# Patient Record
Sex: FEMALE | Race: WHITE | Employment: OTHER | URBAN - METROPOLITAN AREA
[De-identification: names, ages, dates, MRNs, and addresses within clinical notes are randomized per-mention and may not be internally consistent; named-entity substitution may affect disease eponyms.]

---

## 2017-01-12 ENCOUNTER — TELEPHONE (OUTPATIENT)
Dept: FAMILY MEDICINE CLINIC | Facility: CLINIC | Age: 52
End: 2017-01-12

## 2017-01-12 NOTE — TELEPHONE ENCOUNTER
Fax received from [employer] stating prior [place] Andre is required for Baclofen 10 mg tablets. PA initiated through Aidan Dunn. Outcome pending.

## 2017-01-16 RX ORDER — PHENYTOIN SODIUM 100 MG/1
CAPSULE, EXTENDED RELEASE ORAL
Qty: 90 CAPSULE | Refills: 0 | Status: SHIPPED | OUTPATIENT
Start: 2017-01-16 | End: 2017-02-14

## 2017-01-16 NOTE — TELEPHONE ENCOUNTER
Medication: Phenytoin 100 mg  Date of last refill: 12/14/16  [place]    Last office visit: 12/9/2015  Due back to clinic per last office note:  RTC in 8 months  Date next office visit schedul

## 2017-01-18 ENCOUNTER — TELEPHONE (OUTPATIENT)
Dept: FAMILY MEDICINE CLINIC | Facility: CLINIC | Age: 52
End: 2017-01-18

## 2017-01-18 NOTE — TELEPHONE ENCOUNTER
Pt received a letter from the [facility] Darwin Dunn stating there is insufficient medical justification for coverage of baclofen. An appeal can be done within 60 days time, but no form, phone number or address was given to pt in the letter.  She was advised to

## 2017-01-18 NOTE — TELEPHONE ENCOUNTER
Pt notified by phone that PA was initiated but that outcome was not received. She states the letter was dated about that time and may have already in the mail when the request was submitted. PA form re-faxed to [address] for [place].     Pt states she wi

## 2017-01-23 DIAGNOSIS — G72.49 INFLAMMATORY MYOPATHY: Primary | ICD-10-CM

## 2017-01-23 RX ORDER — HYDROCODONE BITARTRATE AND ACETAMINOPHEN 10; 325 MG/1; MG/1
1 TABLET ORAL EVERY 4 HOURS PRN
Qty: 120 TABLET | Refills: 0 | Status: SHIPPED | OUTPATIENT
Start: 2017-01-23 | End: 2017-02-21

## 2017-01-23 NOTE — TELEPHONE ENCOUNTER
Last OV 5/9/16, Future Appointments  Date Time Provider Marsha Pang   1/30/2017 8:00 AM MD HOMER Mix       Last rx given 12/22/16

## 2017-01-24 NOTE — TELEPHONE ENCOUNTER
Patients spouse Ettie Trimble picked up script on 01/24/17. [facility]# [phone].

## 2017-02-07 ENCOUNTER — OFFICE VISIT (OUTPATIENT)
Dept: FAMILY MEDICINE CLINIC | Facility: CLINIC | Age: 52
End: 2017-02-07

## 2017-02-07 VITALS
DIASTOLIC BLOOD PRESSURE: 82 MMHG | TEMPERATURE: 98 F | SYSTOLIC BLOOD PRESSURE: 128 MMHG | OXYGEN SATURATION: 99 % | HEART RATE: 50 BPM | RESPIRATION RATE: 16 BRPM | HEIGHT: 64 IN

## 2017-02-07 DIAGNOSIS — R05.9 COUGH: ICD-10-CM

## 2017-02-07 DIAGNOSIS — G71.02 MUSCULAR DYSTROPHY, FACIOSCAPULOHUMERAL (HCC): ICD-10-CM

## 2017-02-07 DIAGNOSIS — G70.9 NEUROMUSCULAR RESPIRATORY WEAKNESS (HCC): ICD-10-CM

## 2017-02-07 DIAGNOSIS — J99 NEUROMUSCULAR RESPIRATORY WEAKNESS (HCC): ICD-10-CM

## 2017-02-07 DIAGNOSIS — J06.9 VIRAL URI: Primary | ICD-10-CM

## 2017-02-07 PROCEDURE — 99214 OFFICE O/P EST MOD 30 MIN: CPT | Performed by: FAMILY MEDICINE

## 2017-02-07 RX ORDER — DOXYCYCLINE HYCLATE 100 MG
100 TABLET ORAL 2 TIMES DAILY
Qty: 20 TABLET | Refills: 0 | Status: SHIPPED | OUTPATIENT
Start: 2017-02-07 | End: 2017-02-08

## 2017-02-07 NOTE — PROGRESS NOTES
HPI:   Stepan Ballesteros is a 54year old female who presents for upper respiratory symptoms for  3  days. Patient reports sore throat, congestion, clear colored nasal discharge, cough with clear colored sputum.       Current Outpatient Prescriptions:  Doxyc Mother    • Renal Disease Father      ESRD   • Heart Disease Father      CHF   • Diabetes Father         Smoking Status: Former Smoker                   Packs/Day: 0.50  Years: 13        Quit date: 08/31/2013    Smokeless Status: Never Used

## 2017-02-08 ENCOUNTER — TELEPHONE (OUTPATIENT)
Dept: FAMILY MEDICINE CLINIC | Facility: CLINIC | Age: 52
End: 2017-02-08

## 2017-02-08 NOTE — TELEPHONE ENCOUNTER
[facility] stating [facility] is required for doxycycline hyclate 100 mg tablets. PA initiated today. Outcome pending.

## 2017-02-14 ENCOUNTER — OFFICE VISIT (OUTPATIENT)
Dept: NEUROLOGY | Facility: CLINIC | Age: 52
End: 2017-02-14

## 2017-02-14 VITALS — RESPIRATION RATE: 16 BRPM | HEART RATE: 60 BPM | DIASTOLIC BLOOD PRESSURE: 74 MMHG | SYSTOLIC BLOOD PRESSURE: 122 MMHG

## 2017-02-14 DIAGNOSIS — G71.02 MUSCULAR DYSTROPHY, FACIOSCAPULOHUMERAL (HCC): Primary | ICD-10-CM

## 2017-02-14 PROCEDURE — 99213 OFFICE O/P EST LOW 20 MIN: CPT | Performed by: PHYSICIAN ASSISTANT

## 2017-02-14 RX ORDER — PHENYTOIN SODIUM 100 MG/1
CAPSULE, EXTENDED RELEASE ORAL
Qty: 90 CAPSULE | Refills: 5 | Status: SHIPPED | OUTPATIENT
Start: 2017-02-14 | End: 2017-08-06

## 2017-02-14 RX ORDER — DOXYCYCLINE HYCLATE 100 MG/1
100 CAPSULE ORAL 2 TIMES DAILY
COMMUNITY
End: 2017-04-11

## 2017-02-14 NOTE — PROGRESS NOTES
HPI:    Patient ID: Stepan Ballesteros is a 54year old female. 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 She has not been seen at [facility] clinic.  Per patient she was (VITAMIN D) 400 UNITS Oral Cap Take  by mouth. Disp:  Rfl:    ibuprofen (MOTRIN) 600 MG Oral Tab Take 600 mg by mouth every 6 (six) hours as needed.  Indications: PAIN Disp:  Rfl:    PHENYTOIN SODIUM EXTENDED 100 MG Oral Cap TAKE ONE CAPSULE BY MOUTH THREE diagnosis)    Stable. Recommend that she go to the [facility] clinic at [facility] for evaluation. No orders of the defined types were placed in this encounter.        Meds This Visit:    No prescriptions requested or ordered in this encounter       Imaging &

## 2017-02-14 NOTE — TELEPHONE ENCOUNTER
Medication: Phenytoin 100 mg  Date of last refill: 12/14/16  [place] medication    Last office visit: 12/9/2015  Due back to clinic per last office note:  RTC in 8 months  Date next office visit scheduled:  0

## 2017-02-14 NOTE — PATIENT INSTRUCTIONS
Refill policies:    • Allow 2 business days for refills; controlled substances may take longer.   • Contact your pharmacy at least 5 days prior to running out of medication and have them send an electronic request or submit request through the “request re your physician has recommended that you have a procedure or additional testing performed. [facility]) will contact your insurance carrier to obtain pre-certification or prior authorization.     Unfortunately, [facility] has seen an increas

## 2017-02-21 DIAGNOSIS — G72.49 INFLAMMATORY MYOPATHY: Primary | ICD-10-CM

## 2017-02-21 RX ORDER — HYDROCODONE BITARTRATE AND ACETAMINOPHEN 10; 325 MG/1; MG/1
1 TABLET ORAL EVERY 4 HOURS PRN
Qty: 120 TABLET | Refills: 0 | Status: SHIPPED | OUTPATIENT
Start: 2017-02-21 | End: 2017-03-21

## 2017-02-22 NOTE — TELEPHONE ENCOUNTER
Patient picked up script for Nanci Pugh  on 02/22/17. [facility]# [phone].

## 2017-03-13 RX ORDER — FAMOTIDINE 20 MG/1
TABLET ORAL
Qty: 60 TABLET | Refills: 6 | Status: SHIPPED | OUTPATIENT
Start: 2017-03-13 | End: 2017-10-22

## 2017-03-21 DIAGNOSIS — G72.49 INFLAMMATORY MYOPATHY: Primary | ICD-10-CM

## 2017-03-21 RX ORDER — HYDROCODONE BITARTRATE AND ACETAMINOPHEN 10; 325 MG/1; MG/1
1 TABLET ORAL EVERY 4 HOURS PRN
Qty: 120 TABLET | Refills: 0 | Status: SHIPPED | OUTPATIENT
Start: 2017-03-21 | End: 2017-04-17

## 2017-03-21 NOTE — TELEPHONE ENCOUNTER
Patient advised script is ready for . Her  Christofer Reno will be picking up. Advised to bring photo ID.

## 2017-03-21 NOTE — TELEPHONE ENCOUNTER
Last refilled on 2/21/17 for # 120 with 0 rf. Last seen on 2/7/17. No future appointments. Thank you.

## 2017-03-29 RX ORDER — CITALOPRAM 20 MG/1
TABLET ORAL
Qty: 90 TABLET | Refills: 3 | Status: SHIPPED | OUTPATIENT
Start: 2017-03-29 | End: 2018-04-11

## 2017-03-29 NOTE — TELEPHONE ENCOUNTER
Last refill: 5-9-2016 #90 with 3 refills    Last Visit: 2-7-2017    Next Visit: No future appointments. Forward to Dr. Paco Dunn please advise on refills. Thanks.

## 2017-04-11 ENCOUNTER — OFFICE VISIT (OUTPATIENT)
Dept: FAMILY MEDICINE CLINIC | Facility: CLINIC | Age: 52
End: 2017-04-11

## 2017-04-11 VITALS
HEART RATE: 60 BPM | RESPIRATION RATE: 16 BRPM | SYSTOLIC BLOOD PRESSURE: 122 MMHG | DIASTOLIC BLOOD PRESSURE: 90 MMHG | TEMPERATURE: 97 F

## 2017-04-11 DIAGNOSIS — Z51.81 ENCOUNTER FOR MONITORING DILANTIN THERAPY: ICD-10-CM

## 2017-04-11 DIAGNOSIS — Z79.899 ENCOUNTER FOR MONITORING DILANTIN THERAPY: ICD-10-CM

## 2017-04-11 DIAGNOSIS — J99 NEUROMUSCULAR RESPIRATORY WEAKNESS (HCC): ICD-10-CM

## 2017-04-11 DIAGNOSIS — E03.9 ACQUIRED HYPOTHYROIDISM: Primary | ICD-10-CM

## 2017-04-11 DIAGNOSIS — R19.7 DIARRHEA OF PRESUMED INFECTIOUS ORIGIN: ICD-10-CM

## 2017-04-11 DIAGNOSIS — G70.9 NEUROMUSCULAR RESPIRATORY WEAKNESS (HCC): ICD-10-CM

## 2017-04-11 DIAGNOSIS — Z51.81 MEDICATION MONITORING ENCOUNTER: ICD-10-CM

## 2017-04-11 PROCEDURE — 80053 COMPREHEN METABOLIC PANEL: CPT | Performed by: FAMILY MEDICINE

## 2017-04-11 PROCEDURE — 85027 COMPLETE CBC AUTOMATED: CPT | Performed by: FAMILY MEDICINE

## 2017-04-11 PROCEDURE — 36415 COLL VENOUS BLD VENIPUNCTURE: CPT | Performed by: FAMILY MEDICINE

## 2017-04-11 PROCEDURE — 84443 ASSAY THYROID STIM HORMONE: CPT | Performed by: FAMILY MEDICINE

## 2017-04-11 PROCEDURE — 99214 OFFICE O/P EST MOD 30 MIN: CPT | Performed by: FAMILY MEDICINE

## 2017-04-11 RX ORDER — METRONIDAZOLE 500 MG/1
500 TABLET ORAL 2 TIMES DAILY
Qty: 20 TABLET | Refills: 0 | Status: SHIPPED | OUTPATIENT
Start: 2017-04-11 | End: 2017-04-21

## 2017-04-11 NOTE — PROGRESS NOTES
Jassi Flroes is a 54year old female. HPI:   Alicia has had diarrhea for about 2 weeks now, she describes it as yellow and watery, she has not had a fever , no other sick contacts.  She was taking colace but stopped it due to diarrhea, has snot been on otherwise  SKIN: denies any unusual skin lesions or rashes  RESPIRATORY: denies shortness of breath with exertion  CARDIOVASCULAR: denies chest pain on exertion  GI: generalized abdominal pain, and diarrhea, denies heartburn  NEURO: denies headaches    EXA

## 2017-04-12 ENCOUNTER — TELEPHONE (OUTPATIENT)
Dept: FAMILY MEDICINE CLINIC | Facility: CLINIC | Age: 52
End: 2017-04-12

## 2017-04-12 NOTE — TELEPHONE ENCOUNTER
----- Message from Sonia Becker DO sent at 4/12/2017  8:30 AM CDT -----  Can notify Feroz Mins her labs look pretty good overall, she could stand to drink a bit more water, but otherwise hr thyroid looks fine, and some does her blood count

## 2017-04-17 ENCOUNTER — TELEPHONE (OUTPATIENT)
Dept: FAMILY MEDICINE CLINIC | Facility: CLINIC | Age: 52
End: 2017-04-17

## 2017-04-17 DIAGNOSIS — G72.49 INFLAMMATORY MYOPATHY: Primary | ICD-10-CM

## 2017-04-17 RX ORDER — HYDROCODONE BITARTRATE AND ACETAMINOPHEN 10; 325 MG/1; MG/1
1 TABLET ORAL EVERY 4 HOURS PRN
Qty: 120 TABLET | Refills: 0 | Status: SHIPPED | OUTPATIENT
Start: 2017-04-17 | End: 2017-05-18

## 2017-04-17 NOTE — TELEPHONE ENCOUNTER
Well maybe taking a probiotic once a day might be helpful for the diarrhea, and if she is not taking an MVI she should, it would help with the cramping

## 2017-04-17 NOTE — TELEPHONE ENCOUNTER
She had to take a couple of extra doses during the night, when she gets up during the night and have bouts of diarrhea, she has cramps in her legs and cannot get back to sleep.

## 2017-04-17 NOTE — TELEPHONE ENCOUNTER
She started that after last visit. She developed a reaction to the antibiotic, was seen in the ER @ [address] and given fluids and K. Advised to d/c the antibiotic and f/u with PCP if sx did not resolve. That occurred Friday night.  She will continue the p

## 2017-04-18 ENCOUNTER — MED REC SCAN ONLY (OUTPATIENT)
Dept: FAMILY MEDICINE CLINIC | Facility: CLINIC | Age: 52
End: 2017-04-18

## 2017-04-24 ENCOUNTER — TELEPHONE (OUTPATIENT)
Dept: FAMILY MEDICINE CLINIC | Facility: CLINIC | Age: 52
End: 2017-04-24

## 2017-04-30 RX ORDER — LEVOTHYROXINE SODIUM 0.15 MG/1
TABLET ORAL
Qty: 90 TABLET | Refills: 1 | Status: SHIPPED | OUTPATIENT
Start: 2017-04-30 | End: 2017-10-22

## 2017-05-01 ENCOUNTER — OFFICE VISIT (OUTPATIENT)
Dept: FAMILY MEDICINE CLINIC | Facility: CLINIC | Age: 52
End: 2017-05-01

## 2017-05-01 VITALS
HEIGHT: 64 IN | WEIGHT: 195 LBS | BODY MASS INDEX: 33.29 KG/M2 | TEMPERATURE: 99 F | HEART RATE: 60 BPM | SYSTOLIC BLOOD PRESSURE: 130 MMHG | OXYGEN SATURATION: 98 % | DIASTOLIC BLOOD PRESSURE: 74 MMHG | RESPIRATION RATE: 14 BRPM

## 2017-05-01 DIAGNOSIS — A09 DIARRHEA OF INFECTIOUS ORIGIN: Primary | ICD-10-CM

## 2017-05-01 DIAGNOSIS — R10.84 GENERALIZED ABDOMINAL PAIN: ICD-10-CM

## 2017-05-01 PROCEDURE — 99214 OFFICE O/P EST MOD 30 MIN: CPT | Performed by: FAMILY MEDICINE

## 2017-05-01 RX ORDER — POTASSIUM CHLORIDE 20 MEQ/1
TABLET, EXTENDED RELEASE ORAL
Refills: 0 | COMMUNITY
Start: 2017-04-22 | End: 2018-02-13 | Stop reason: ALTCHOICE

## 2017-05-01 NOTE — PROGRESS NOTES
Marta Adams is a 54year old female.   HPI:   Omar Grimm is here for follow up on her diarrhea, she did the [employer] and then became constipated and then started eating more fiber and developed severe abdominal pain and cramping and then passed out due to Diagnosis Date   • Hypothyroid    • MD (muscular dystrophy) ([facility] [address]    • Shoulder fracture      right side;  September 2014; post-fall      Social History:    Smoking Status: Former Smoker                   Packs/Day: 0.50  Years: 13        Quit date: 08/31

## 2017-05-16 ENCOUNTER — MED REC SCAN ONLY (OUTPATIENT)
Dept: FAMILY MEDICINE CLINIC | Facility: CLINIC | Age: 52
End: 2017-05-16

## 2017-05-18 ENCOUNTER — TELEPHONE (OUTPATIENT)
Dept: FAMILY MEDICINE CLINIC | Facility: CLINIC | Age: 52
End: 2017-05-18

## 2017-05-18 DIAGNOSIS — G72.49 INFLAMMATORY MYOPATHY: Primary | ICD-10-CM

## 2017-05-18 RX ORDER — HYDROCODONE BITARTRATE AND ACETAMINOPHEN 10; 325 MG/1; MG/1
1 TABLET ORAL EVERY 4 HOURS PRN
Qty: 120 TABLET | Refills: 0 | Status: SHIPPED | OUTPATIENT
Start: 2017-05-18 | End: 2017-06-16

## 2017-06-16 ENCOUNTER — TELEPHONE (OUTPATIENT)
Dept: FAMILY MEDICINE CLINIC | Facility: CLINIC | Age: 52
End: 2017-06-16

## 2017-06-16 DIAGNOSIS — G72.49 INFLAMMATORY MYOPATHY: Primary | ICD-10-CM

## 2017-06-16 NOTE — TELEPHONE ENCOUNTER
Patient notified Dr Ashutosh Pedro is out of the office until Monday. States she has pills to last until Monday morning, will need to  script first thing that day.     Last OV 5/1/17  Last refilled 5/18/17  #120  0 refills

## 2017-06-19 RX ORDER — HYDROCODONE BITARTRATE AND ACETAMINOPHEN 10; 325 MG/1; MG/1
1 TABLET ORAL EVERY 4 HOURS PRN
Qty: 120 TABLET | Refills: 0 | Status: SHIPPED | OUTPATIENT
Start: 2017-06-19 | End: 2017-07-17

## 2017-06-19 NOTE — TELEPHONE ENCOUNTER
Signed rx placed at reception desk for pickup. Pt notified by phone. Her sister, Michael Hutton, or  Tereza Cruz will  rx.

## 2017-07-07 RX ORDER — BACLOFEN 10 MG/1
TABLET ORAL
Qty: 135 TABLET | Refills: 0 | Status: SHIPPED | OUTPATIENT
Start: 2017-07-07 | End: 2017-10-12

## 2017-07-15 ENCOUNTER — TELEPHONE (OUTPATIENT)
Dept: FAMILY MEDICINE CLINIC | Facility: CLINIC | Age: 52
End: 2017-07-15

## 2017-07-15 NOTE — TELEPHONE ENCOUNTER
Fax received stating citalopram requires prior authorization. Drug prior auth form completed for [facility] and faxed to [phone]. Outcome pending.

## 2017-07-17 DIAGNOSIS — G72.49 INFLAMMATORY MYOPATHY: ICD-10-CM

## 2017-07-17 RX ORDER — HYDROCODONE BITARTRATE AND ACETAMINOPHEN 10; 325 MG/1; MG/1
1 TABLET ORAL EVERY 4 HOURS PRN
Qty: 120 TABLET | Refills: 0 | Status: SHIPPED | OUTPATIENT
Start: 2017-07-17 | End: 2017-08-16

## 2017-07-17 NOTE — TELEPHONE ENCOUNTER
HYDROcodone-acetaminophen  MG Oral Tab 120 tablet 0 6/19/2017 7/19/2017    Sig - Route: Take 1 tablet by mouth every 4 (four) hours as needed for Pain. - Oral      Patients spouse, Luis Jeffrey will  script.  Advised patient to have spouse b

## 2017-08-07 RX ORDER — PHENYTOIN SODIUM 100 MG/1
CAPSULE, EXTENDED RELEASE ORAL
Qty: 90 CAPSULE | Refills: 5 | Status: SHIPPED | OUTPATIENT
Start: 2017-08-07 | End: 2018-01-29

## 2017-08-09 ENCOUNTER — TELEPHONE (OUTPATIENT)
Dept: FAMILY MEDICINE CLINIC | Facility: CLINIC | Age: 52
End: 2017-08-09

## 2017-08-09 NOTE — TELEPHONE ENCOUNTER
Fax received from [employer] stating coverage was denied for baclofen because \"muscle relaxants should generally be limited to relatively short-term terhapy (one to three weeks).  The patient has exceeded three mo

## 2017-08-11 ENCOUNTER — TELEPHONE (OUTPATIENT)
Dept: FAMILY MEDICINE CLINIC | Facility: CLINIC | Age: 52
End: 2017-08-11

## 2017-08-11 NOTE — TELEPHONE ENCOUNTER
Per patient she has spoken with medicaid and was told the pharmacy is sending the wrong form. Our office needs to fill out a prior authorization form. Advised patient Dr Jordan Bill out of office today and form will be sent Monday.   Patient asking if covering p

## 2017-08-11 NOTE — TELEPHONE ENCOUNTER
Patient states she is having trouble getting baclofen from the pharmacy. Called and spoke with Murrel Apgar at [place] who states patient needs a 4 script override. They have submitted it and have not heard back.

## 2017-08-11 NOTE — TELEPHONE ENCOUNTER
Pt is having an issue getting her script and would like to speak with a nurse about it. Please call back.

## 2017-08-12 NOTE — TELEPHONE ENCOUNTER
I sent in a PA and it got rejected because she didn't qualify. She has MD and needs it for spasm, WC bound.  The form  Is on my  counter

## 2017-08-15 ENCOUNTER — MED REC SCAN ONLY (OUTPATIENT)
Dept: FAMILY MEDICINE CLINIC | Facility: CLINIC | Age: 52
End: 2017-08-15

## 2017-08-16 ENCOUNTER — TELEPHONE (OUTPATIENT)
Dept: FAMILY MEDICINE CLINIC | Facility: CLINIC | Age: 52
End: 2017-08-16

## 2017-08-16 DIAGNOSIS — G72.49 INFLAMMATORY MYOPATHY: ICD-10-CM

## 2017-08-16 RX ORDER — HYDROCODONE BITARTRATE AND ACETAMINOPHEN 10; 325 MG/1; MG/1
1 TABLET ORAL EVERY 4 HOURS PRN
Qty: 120 TABLET | Refills: 0 | Status: SHIPPED | OUTPATIENT
Start: 2017-08-16 | End: 2017-09-15

## 2017-08-16 NOTE — TELEPHONE ENCOUNTER
Signed rx placed at reception desk for pickup. Pt notified by phone. Pt or sister Sergio Oswald will  rx.

## 2017-08-16 NOTE — TELEPHONE ENCOUNTER
Medication was denied coverage because of \"insufficient medical justication. \" Clinic notes/justification for use were requested. Office note from neurology dated 2/14/17 faxed to [phone] with tracking #[id number].

## 2017-08-16 NOTE — TELEPHONE ENCOUNTER
Pt called, needs refill on HYDROcodone-acetaminophen  MG Oral Tab. Pt Spouse or her sister, Jane Lopez, will  script.    Please call pt at [phone]

## 2017-08-23 ENCOUNTER — PATIENT OUTREACH (OUTPATIENT)
Dept: FAMILY MEDICINE CLINIC | Facility: CLINIC | Age: 52
End: 2017-08-23

## 2017-09-15 DIAGNOSIS — G72.49 INFLAMMATORY MYOPATHY: ICD-10-CM

## 2017-09-15 RX ORDER — HYDROCODONE BITARTRATE AND ACETAMINOPHEN 10; 325 MG/1; MG/1
1 TABLET ORAL EVERY 4 HOURS PRN
Qty: 120 TABLET | Refills: 0 | Status: SHIPPED | OUTPATIENT
Start: 2017-09-15 | End: 2017-10-17

## 2017-09-15 NOTE — TELEPHONE ENCOUNTER
HYDROcodone-acetaminophen  MG Oral Tab     Either pt or spouse Manoj Toribio will be picking script up.

## 2017-10-12 RX ORDER — BACLOFEN 10 MG/1
TABLET ORAL
Qty: 135 TABLET | Refills: 3 | Status: SHIPPED | OUTPATIENT
Start: 2017-10-12 | End: 2017-11-30

## 2017-10-17 DIAGNOSIS — G72.49 INFLAMMATORY MYOPATHY: ICD-10-CM

## 2017-10-17 RX ORDER — HYDROCODONE BITARTRATE AND ACETAMINOPHEN 10; 325 MG/1; MG/1
1 TABLET ORAL EVERY 4 HOURS PRN
Qty: 120 TABLET | Refills: 0 | Status: SHIPPED | OUTPATIENT
Start: 2017-10-17 | End: 2017-11-14

## 2017-10-17 NOTE — TELEPHONE ENCOUNTER
PT CALLED AND ADV NEEDS REFILL OF     HYDROcodone-acetaminophen  MG Oral Tab    PLEASE CALL WHEN READY FOR P/U     THANK YOU

## 2017-10-23 RX ORDER — LEVOTHYROXINE SODIUM 0.15 MG/1
TABLET ORAL
Qty: 90 TABLET | Refills: 1 | Status: SHIPPED | OUTPATIENT
Start: 2017-10-23 | End: 2018-04-23

## 2017-10-23 RX ORDER — FAMOTIDINE 20 MG/1
TABLET ORAL
Qty: 60 TABLET | Refills: 6 | Status: SHIPPED | OUTPATIENT
Start: 2017-10-23 | End: 2018-05-27

## 2017-10-23 NOTE — TELEPHONE ENCOUNTER
Last OV 5/1/17  Last lab 4/11/17  TSH 0.443  Last refilled:  4/30/17 Levothyroxine #90  1 refill  3/13/17  Famotidine #60  6 refills

## 2017-11-14 ENCOUNTER — OFFICE VISIT (OUTPATIENT)
Dept: FAMILY MEDICINE CLINIC | Facility: CLINIC | Age: 52
End: 2017-11-14

## 2017-11-14 VITALS
HEART RATE: 64 BPM | DIASTOLIC BLOOD PRESSURE: 82 MMHG | TEMPERATURE: 97 F | SYSTOLIC BLOOD PRESSURE: 130 MMHG | RESPIRATION RATE: 16 BRPM

## 2017-11-14 DIAGNOSIS — G71.02 MUSCULAR DYSTROPHY, FACIOSCAPULOHUMERAL (HCC): ICD-10-CM

## 2017-11-14 DIAGNOSIS — J99 NEUROMUSCULAR RESPIRATORY WEAKNESS (HCC): ICD-10-CM

## 2017-11-14 DIAGNOSIS — G72.49 INFLAMMATORY MYOPATHY: ICD-10-CM

## 2017-11-14 DIAGNOSIS — M76.891 HIP FLEXOR TENDINITIS, RIGHT: Primary | ICD-10-CM

## 2017-11-14 DIAGNOSIS — G70.9 NEUROMUSCULAR RESPIRATORY WEAKNESS (HCC): ICD-10-CM

## 2017-11-14 PROCEDURE — 99214 OFFICE O/P EST MOD 30 MIN: CPT | Performed by: FAMILY MEDICINE

## 2017-11-14 RX ORDER — HYDROCODONE BITARTRATE AND ACETAMINOPHEN 10; 325 MG/1; MG/1
1 TABLET ORAL EVERY 4 HOURS PRN
Qty: 120 TABLET | Refills: 0 | Status: SHIPPED | OUTPATIENT
Start: 2017-11-14 | End: 2017-12-13

## 2017-11-14 NOTE — PROGRESS NOTES
Jorge Oliver is a 50year old female. HPI:   Anthony Winston is here for discussion of right hip and leg pain, she has been developing over the past couple of months.  She is able to walk but not that far, and when she does she gets anterior and lateral hip pain History:  Smoking status: Former Smoker                                                              Packs/day: 0.50      Years: 15.00        Quit date: 8/31/2013  Smokeless tobacco: Never Used                      Alcohol use:  No                 REVIEW OF

## 2017-11-16 ENCOUNTER — OFFICE VISIT (OUTPATIENT)
Dept: PHYSICAL THERAPY | Age: 52
End: 2017-11-16
Attending: FAMILY MEDICINE
Payer: MEDICAID

## 2017-11-16 DIAGNOSIS — G70.9 NEUROMUSCULAR RESPIRATORY WEAKNESS (HCC): ICD-10-CM

## 2017-11-16 DIAGNOSIS — M76.891 HIP FLEXOR TENDINITIS, RIGHT: ICD-10-CM

## 2017-11-16 DIAGNOSIS — G71.02 MUSCULAR DYSTROPHY, FACIOSCAPULOHUMERAL (HCC): ICD-10-CM

## 2017-11-16 DIAGNOSIS — G72.49 INFLAMMATORY MYOPATHY: ICD-10-CM

## 2017-11-16 DIAGNOSIS — J99 NEUROMUSCULAR RESPIRATORY WEAKNESS (HCC): ICD-10-CM

## 2017-11-16 PROCEDURE — 97163 PT EVAL HIGH COMPLEX 45 MIN: CPT

## 2017-11-16 PROCEDURE — 97110 THERAPEUTIC EXERCISES: CPT

## 2017-11-16 NOTE — PROGRESS NOTES
LOWER EXTREMITY EVALUATION:   Referring Physician: Dr. Cruz Fearing  Diagnosis: Hip flexor tendinitis, right  Date of Service: 11/16/2017     PATIENT SUMMARY   Jassi Flores is a 50year old y/o female who presents to therapy today with complaints of right hi Cancer  OBJECTIVE:   Observation: Presents in a wheel chair with a brace to support her neck  Gait: Ambulates with sway back positioning  Palpation: Mild tenderness to right hip flexor    AROM:   Hip Knee Foot/Ankle   Flexion: R 50; L 50  PROM Hip IR: WNL agreed to actively participate in planning and for this course of care. Thank you for your referral. Please co-sign or sign and return this letter via fax as soon as possible to [phone].  If you have any questions, please contact me at Dept: [phone]

## 2017-11-20 ENCOUNTER — OFFICE VISIT (OUTPATIENT)
Dept: PHYSICAL THERAPY | Age: 52
End: 2017-11-20
Attending: FAMILY MEDICINE
Payer: MEDICAID

## 2017-11-20 PROCEDURE — 97110 THERAPEUTIC EXERCISES: CPT

## 2017-11-20 NOTE — PROGRESS NOTES
Dx: Hip flexor tendinitis, right        Authorized # of Visits:  Medicaid 12 recommended       Next MD visit: none scheduled Fall Risk: standard         Precautions: Hx of cancer, Dx with Muscular Dystrophy             Subjective: She is fatigued today, sh

## 2017-11-30 ENCOUNTER — TELEPHONE (OUTPATIENT)
Dept: FAMILY MEDICINE CLINIC | Facility: CLINIC | Age: 52
End: 2017-11-30

## 2017-11-30 ENCOUNTER — OFFICE VISIT (OUTPATIENT)
Dept: PHYSICAL THERAPY | Age: 52
End: 2017-11-30
Attending: FAMILY MEDICINE
Payer: MEDICAID

## 2017-11-30 DIAGNOSIS — M76.891 HIP FLEXOR TENDINITIS, RIGHT: ICD-10-CM

## 2017-11-30 DIAGNOSIS — J99 NEUROMUSCULAR RESPIRATORY WEAKNESS (HCC): ICD-10-CM

## 2017-11-30 DIAGNOSIS — G71.02 MUSCULAR DYSTROPHY, FACIOSCAPULOHUMERAL (HCC): ICD-10-CM

## 2017-11-30 DIAGNOSIS — G72.49 INFLAMMATORY MYOPATHY: ICD-10-CM

## 2017-11-30 DIAGNOSIS — G70.9 NEUROMUSCULAR RESPIRATORY WEAKNESS (HCC): ICD-10-CM

## 2017-11-30 PROCEDURE — 97110 THERAPEUTIC EXERCISES: CPT

## 2017-11-30 RX ORDER — BACLOFEN 10 MG/1
TABLET ORAL
Qty: 225 TABLET | Refills: 0 | Status: SHIPPED | OUTPATIENT
Start: 2017-11-30 | End: 2018-02-28

## 2017-11-30 NOTE — PROGRESS NOTES
Dx: Hip flexor tendinitis, right        Authorized # of Visits:  Medicaid 12 recommended       Next MD visit: none scheduled Fall Risk: standard         Precautions: Hx of cancer, Dx with Muscular Dystrophy             Subjective: She states it doesn't fee Seated hip IR/ER AROM x 10 ea        - Review of current exercises                 Manual Manual Manual Manual  Manual  Manual Manual   R hip PROM flexion -        Anterior hip STM x 4 min -        -                  N Re-Ed N Re-Ed  N Re-Ed  N Re-Ed  N Re

## 2017-11-30 NOTE — TELEPHONE ENCOUNTER
Last refilled on 10/12/17 for # 135 with 3 rf. Last seen on 11/14/17. Baclofen increased at this time.   \"Can increase the baclofen to 1 in the AM, 1/2 at noon, and 1 in the PM\"  Future Appointments  Date Time Provider Marsha Pang   11/30/2017 4:30

## 2017-12-04 ENCOUNTER — OFFICE VISIT (OUTPATIENT)
Dept: PHYSICAL THERAPY | Age: 52
End: 2017-12-04
Attending: FAMILY MEDICINE
Payer: MEDICAID

## 2017-12-04 DIAGNOSIS — M76.891 HIP FLEXOR TENDINITIS, RIGHT: ICD-10-CM

## 2017-12-04 DIAGNOSIS — G72.49 INFLAMMATORY MYOPATHY: ICD-10-CM

## 2017-12-04 DIAGNOSIS — G71.02 MUSCULAR DYSTROPHY, FACIOSCAPULOHUMERAL (HCC): ICD-10-CM

## 2017-12-04 DIAGNOSIS — G70.9 NEUROMUSCULAR RESPIRATORY WEAKNESS (HCC): ICD-10-CM

## 2017-12-04 DIAGNOSIS — J99 NEUROMUSCULAR RESPIRATORY WEAKNESS (HCC): ICD-10-CM

## 2017-12-04 PROCEDURE — 97110 THERAPEUTIC EXERCISES: CPT

## 2017-12-04 NOTE — PROGRESS NOTES
Dx: Hip flexor tendinitis, right        Authorized # of Visits:  Medicaid 12 recommended       Next MD visit: none scheduled Fall Risk: standard         Precautions: Hx of cancer, Dx with Muscular Dystrophy             Subjective: She states she was in mor - Review of current exercises -                Manual Manual Manual Manual  Manual  Manual Manual   R hip PROM flexion - Roller to leo quads x 4 min ea       Anterior hip STM x 4 min - STM to right hip flexor x 2 min       -                  N Re-Ed N Re

## 2017-12-07 ENCOUNTER — APPOINTMENT (OUTPATIENT)
Dept: PHYSICAL THERAPY | Age: 52
End: 2017-12-07
Attending: FAMILY MEDICINE
Payer: MEDICAID

## 2017-12-11 ENCOUNTER — OFFICE VISIT (OUTPATIENT)
Dept: PHYSICAL THERAPY | Age: 52
End: 2017-12-11
Attending: FAMILY MEDICINE
Payer: MEDICAID

## 2017-12-11 DIAGNOSIS — M76.891 HIP FLEXOR TENDINITIS, RIGHT: ICD-10-CM

## 2017-12-11 DIAGNOSIS — J99 NEUROMUSCULAR RESPIRATORY WEAKNESS (HCC): ICD-10-CM

## 2017-12-11 DIAGNOSIS — G71.02 MUSCULAR DYSTROPHY, FACIOSCAPULOHUMERAL (HCC): ICD-10-CM

## 2017-12-11 DIAGNOSIS — G70.9 NEUROMUSCULAR RESPIRATORY WEAKNESS (HCC): ICD-10-CM

## 2017-12-11 DIAGNOSIS — G72.49 INFLAMMATORY MYOPATHY: ICD-10-CM

## 2017-12-11 PROCEDURE — 97110 THERAPEUTIC EXERCISES: CPT

## 2017-12-11 NOTE — PROGRESS NOTES
Dx: Hip flexor tendinitis, right        Authorized # of Visits:  Medicaid 12 recommended       Next MD visit: none scheduled Fall Risk: standard         Precautions: Hx of cancer, Dx with Muscular Dystrophy             Subjective: She states she was had be leg press 10 sec x 10      - Review of current exercises - -               Manual Manual Manual Manual  Manual  Manual Manual   R hip PROM flexion - Roller to leo quads x 4 min ea Roller to leo quads x 4 min ea      Anterior hip STM x 4 min - STM to right

## 2017-12-13 DIAGNOSIS — G72.49 INFLAMMATORY MYOPATHY: ICD-10-CM

## 2017-12-13 RX ORDER — HYDROCODONE BITARTRATE AND ACETAMINOPHEN 10; 325 MG/1; MG/1
1 TABLET ORAL EVERY 4 HOURS PRN
Qty: 120 TABLET | Refills: 0 | Status: SHIPPED | OUTPATIENT
Start: 2017-12-13 | End: 2018-01-12

## 2017-12-13 NOTE — TELEPHONE ENCOUNTER
Last rf 11/14/17 #120x0  Last ov 11/14/17    Future Appointments  Date Time Provider [facility] Poly   12/14/2017 4:30 PM Conrad Andino, [address] [place] [address]   12/18/2017 4:30 PM Conrad Andino PT [facility] Phys Yajaira Dove   12/21/2017 4:30 PM Miranda Kim

## 2017-12-13 NOTE — TELEPHONE ENCOUNTER
Pt needs a refill of the     HYDROcodone-acetaminophen  MG Oral Tab    Please return call when ready

## 2017-12-14 ENCOUNTER — OFFICE VISIT (OUTPATIENT)
Dept: PHYSICAL THERAPY | Age: 52
End: 2017-12-14
Attending: FAMILY MEDICINE
Payer: MEDICAID

## 2017-12-14 DIAGNOSIS — G71.02 MUSCULAR DYSTROPHY, FACIOSCAPULOHUMERAL (HCC): ICD-10-CM

## 2017-12-14 DIAGNOSIS — G70.9 NEUROMUSCULAR RESPIRATORY WEAKNESS (HCC): ICD-10-CM

## 2017-12-14 DIAGNOSIS — M76.891 HIP FLEXOR TENDINITIS, RIGHT: ICD-10-CM

## 2017-12-14 DIAGNOSIS — J99 NEUROMUSCULAR RESPIRATORY WEAKNESS (HCC): ICD-10-CM

## 2017-12-14 DIAGNOSIS — G72.49 INFLAMMATORY MYOPATHY: ICD-10-CM

## 2017-12-14 PROCEDURE — 97110 THERAPEUTIC EXERCISES: CPT

## 2017-12-14 NOTE — PROGRESS NOTES
Dx: Hip flexor tendinitis, right        Authorized # of Visits:  Medicaid 12 recommended       Next MD visit: none scheduled Fall Risk: standard         Precautions: Hx of cancer, Dx with Muscular Dystrophy             Subjective: She states she has been d Supine heel slide with hip flexion/min assist x 10 ea  Standing hip abd/ext slide x 10     H/L hip abd with yellow tband x 15 Seated hip IR/ER AROM x 10 ea Supine leg press 5 sec x 10 Supine leg press 10 sec x 10 Supine leg press 10 sec x 10     - Review o

## 2017-12-18 ENCOUNTER — APPOINTMENT (OUTPATIENT)
Dept: PHYSICAL THERAPY | Age: 52
End: 2017-12-18
Attending: FAMILY MEDICINE
Payer: MEDICAID

## 2017-12-18 NOTE — PROGRESS NOTES
Dx: Hip flexor tendinitis, right        Authorized # of Visits:  Medicaid 12 recommended       Next MD visit: none scheduled Fall Risk: standard         Precautions: Hx of cancer, Dx with Muscular Dystrophy             Subjective: She states she has been d ea  Supine heel slide with hip flexion/min assist x 10 ea  Standing hip abd/ext slide x 10     H/L hip abd with yellow tband x 15 Seated hip IR/ER AROM x 10 ea Supine leg press 5 sec x 10 Supine leg press 10 sec x 10 Supine leg press 10 sec x 10     - Revi

## 2017-12-21 ENCOUNTER — APPOINTMENT (OUTPATIENT)
Dept: PHYSICAL THERAPY | Age: 52
End: 2017-12-21
Attending: FAMILY MEDICINE
Payer: MEDICAID

## 2017-12-26 ENCOUNTER — MED REC SCAN ONLY (OUTPATIENT)
Dept: FAMILY MEDICINE CLINIC | Facility: CLINIC | Age: 52
End: 2017-12-26

## 2018-01-02 ENCOUNTER — APPOINTMENT (OUTPATIENT)
Dept: PHYSICAL THERAPY | Age: 53
End: 2018-01-02
Attending: FAMILY MEDICINE
Payer: MEDICAID

## 2018-01-12 DIAGNOSIS — G72.49 INFLAMMATORY MYOPATHY: ICD-10-CM

## 2018-01-12 NOTE — TELEPHONE ENCOUNTER
HYDROcodone-acetaminophen  MG Oral Tab pt has enough to make it to Monday. Spouse will be picking script up.

## 2018-01-12 NOTE — TELEPHONE ENCOUNTER
LOV: 11/14/17  Last Refill: 12/13/17 #120 0 RF    Future Appointments  Date Time Provider Marsha Pang   1/15/2018 5:15 PM Yousif Garcia, [address] [facility] [place]

## 2018-01-15 ENCOUNTER — APPOINTMENT (OUTPATIENT)
Dept: PHYSICAL THERAPY | Age: 53
End: 2018-01-15
Attending: FAMILY MEDICINE
Payer: MEDICAID

## 2018-01-15 RX ORDER — HYDROCODONE BITARTRATE AND ACETAMINOPHEN 10; 325 MG/1; MG/1
1 TABLET ORAL EVERY 4 HOURS PRN
Qty: 120 TABLET | Refills: 0 | Status: SHIPPED | OUTPATIENT
Start: 2018-01-15 | End: 2018-02-14

## 2018-01-29 ENCOUNTER — APPOINTMENT (OUTPATIENT)
Dept: PHYSICAL THERAPY | Age: 53
End: 2018-01-29
Attending: FAMILY MEDICINE
Payer: MEDICAID

## 2018-01-29 RX ORDER — PHENYTOIN SODIUM 100 MG/1
CAPSULE, EXTENDED RELEASE ORAL
Qty: 90 CAPSULE | Refills: 0 | Status: SHIPPED | OUTPATIENT
Start: 2018-01-29 | End: 2018-02-28

## 2018-01-29 NOTE — TELEPHONE ENCOUNTER
Medication: Phenytoin 100 mg     Date of last refill: 08/07/17 with 5 addt refills  [place]      Last office visit: 2/14/2017  Due back to clinic per last office note:  RTN in 1 year by 02/14/18  Date next office visi

## 2018-02-12 ENCOUNTER — APPOINTMENT (OUTPATIENT)
Dept: PHYSICAL THERAPY | Age: 53
End: 2018-02-12
Attending: FAMILY MEDICINE
Payer: MEDICAID

## 2018-02-13 ENCOUNTER — OFFICE VISIT (OUTPATIENT)
Dept: NEUROLOGY | Facility: CLINIC | Age: 53
End: 2018-02-13

## 2018-02-13 ENCOUNTER — LAB ENCOUNTER (OUTPATIENT)
Dept: LAB | Age: 53
End: 2018-02-13
Attending: PHYSICIAN ASSISTANT
Payer: MEDICAID

## 2018-02-13 VITALS — SYSTOLIC BLOOD PRESSURE: 121 MMHG | DIASTOLIC BLOOD PRESSURE: 70 MMHG | RESPIRATION RATE: 16 BRPM | HEART RATE: 82 BPM

## 2018-02-13 DIAGNOSIS — G71.02 MUSCULAR DYSTROPHY, FACIOSCAPULOHUMERAL (HCC): Primary | ICD-10-CM

## 2018-02-13 DIAGNOSIS — G71.02 MUSCULAR DYSTROPHY, FACIOSCAPULOHUMERAL (HCC): ICD-10-CM

## 2018-02-13 LAB
ALBUMIN SERPL-MCNC: 3.5 G/DL (ref 3.5–4.8)
ALP LIVER SERPL-CCNC: 141 U/L (ref 39–100)
ALT SERPL-CCNC: 20 U/L (ref 14–54)
AST SERPL-CCNC: 14 U/L (ref 15–41)
BASOPHILS # BLD AUTO: 0.04 X10(3) UL (ref 0–0.1)
BASOPHILS NFR BLD AUTO: 0.4 %
BILIRUB SERPL-MCNC: 0.3 MG/DL (ref 0.1–2)
BUN BLD-MCNC: 7 MG/DL (ref 8–20)
CALCIUM BLD-MCNC: 9.3 MG/DL (ref 8.3–10.3)
CHLORIDE: 106 MMOL/L (ref 101–111)
CO2: 29 MMOL/L (ref 22–32)
CREAT BLD-MCNC: 0.6 MG/DL (ref 0.55–1.02)
EOSINOPHIL # BLD AUTO: 0.15 X10(3) UL (ref 0–0.3)
EOSINOPHIL NFR BLD AUTO: 1.4 %
ERYTHROCYTE [DISTWIDTH] IN BLOOD BY AUTOMATED COUNT: 12.5 % (ref 11.5–16)
GLUCOSE BLD-MCNC: 84 MG/DL (ref 70–99)
HCT VFR BLD AUTO: 40.7 % (ref 34–50)
HGB BLD-MCNC: 13.1 G/DL (ref 12–16)
IMMATURE GRANULOCYTE COUNT: 0.02 X10(3) UL (ref 0–1)
IMMATURE GRANULOCYTE RATIO %: 0.2 %
LYMPHOCYTES # BLD AUTO: 2.29 X10(3) UL (ref 0.9–4)
LYMPHOCYTES NFR BLD AUTO: 21.7 %
M PROTEIN MFR SERPL ELPH: 7.3 G/DL (ref 6.1–8.3)
MCH RBC QN AUTO: 31.1 PG (ref 27–33.2)
MCHC RBC AUTO-ENTMCNC: 32.2 G/DL (ref 31–37)
MCV RBC AUTO: 96.7 FL (ref 81–100)
MONOCYTES # BLD AUTO: 0.68 X10(3) UL (ref 0.1–1)
MONOCYTES NFR BLD AUTO: 6.5 %
NEUTROPHIL ABS PRELIM: 7.35 X10 (3) UL (ref 1.3–6.7)
NEUTROPHILS # BLD AUTO: 7.35 X10(3) UL (ref 1.3–6.7)
NEUTROPHILS NFR BLD AUTO: 69.8 %
PLATELET # BLD AUTO: 240 10(3)UL (ref 150–450)
POTASSIUM SERPL-SCNC: 4.9 MMOL/L (ref 3.6–5.1)
RBC # BLD AUTO: 4.21 X10(6)UL (ref 3.8–5.1)
RED CELL DISTRIBUTION WIDTH-SD: 43.8 FL (ref 35.1–46.3)
SODIUM SERPL-SCNC: 140 MMOL/L (ref 136–144)
WBC # BLD AUTO: 10.5 X10(3) UL (ref 4–13)

## 2018-02-13 PROCEDURE — 80053 COMPREHEN METABOLIC PANEL: CPT

## 2018-02-13 PROCEDURE — 36415 COLL VENOUS BLD VENIPUNCTURE: CPT

## 2018-02-13 PROCEDURE — 99214 OFFICE O/P EST MOD 30 MIN: CPT | Performed by: PHYSICIAN ASSISTANT

## 2018-02-13 PROCEDURE — 82550 ASSAY OF CK (CPK): CPT

## 2018-02-13 PROCEDURE — 85025 COMPLETE CBC W/AUTO DIFF WBC: CPT

## 2018-02-13 NOTE — PROGRESS NOTES
HPI:    Patient ID: Christopher Recinos is a 50year old female. 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 She has not been seen at [facility] clinic. She has also still not completed the g HYDROBROMIDE 20 MG Oral Tab TAKE 1 TABLET BY MOUTH EVERY DAY Disp: 90 tablet Rfl: 3   docusate sodium (COLACE) 100 MG Oral Cap Take 100 mg by mouth 2 (two) times daily as needed.    Disp:  Rfl:    Cholecalciferol (VITAMIN D) 400 UNITS Oral Cap Take  by mout encounter diagnosis)    Currently stable. Refilled Phenytoin. Will get labs for medication monitoring. CK ordered. Referred patient to Dr. John Greene at [facility] for second opinion.        Orders Placed This Encounter      CBC W Differential W Platelet      Comp

## 2018-02-13 NOTE — PATIENT INSTRUCTIONS
Refill policies:    • Allow 2-3 business days for refills; controlled substances may take longer.   • Contact your pharmacy at least 5 days prior to running out of medication and have them send an electronic request or submit request through the [facility] recommended that you have a procedure or additional testing performed. [facility]) will contact your insurance carrier to obtain pre-certification or prior authorization.     Unfortunately, [facility] has seen an increase in denial of paym

## 2018-02-14 DIAGNOSIS — G72.49 INFLAMMATORY MYOPATHY: ICD-10-CM

## 2018-02-14 RX ORDER — HYDROCODONE BITARTRATE AND ACETAMINOPHEN 10; 325 MG/1; MG/1
1 TABLET ORAL EVERY 4 HOURS PRN
Qty: 120 TABLET | Refills: 0 | Status: SHIPPED | OUTPATIENT
Start: 2018-02-14 | End: 2018-03-14

## 2018-02-14 NOTE — TELEPHONE ENCOUNTER
LOV: 5/1/17  Last Refill:1/15/18  #120 0 RF    Future Appointments  Date Time Provider Marsha Poly   3/5/2018 4:30 PM Claudia Esposito, [address] [facility]   7/30/2018 10:20 AM MD HOMER Pop

## 2018-02-14 NOTE — TELEPHONE ENCOUNTER
Pt , David Gardena, picked up pt's script for Hydrocodone.     [facility] [place] # [phone]

## 2018-02-15 LAB — CK: 46 IU/L (ref 26–192)

## 2018-02-20 ENCOUNTER — TELEPHONE (OUTPATIENT)
Dept: NEUROLOGY | Facility: CLINIC | Age: 53
End: 2018-02-20

## 2018-02-20 NOTE — TELEPHONE ENCOUNTER
Spoke to patient and informed her that her alk phos was elevated which can be seen with the dilantin but recommended discussing with pcp if there are any other reasons and the abnormalities seen on her cbc. She has an appointment in April with Dr. Ricco Burdick.  P

## 2018-02-28 RX ORDER — BACLOFEN 10 MG/1
TABLET ORAL
Qty: 225 TABLET | Refills: 3 | Status: SHIPPED | OUTPATIENT
Start: 2018-02-28 | End: 2019-02-13

## 2018-02-28 NOTE — TELEPHONE ENCOUNTER
Medication: Dilantin 100 mg    Date of last refill: 01/29/18  [place]     Last office visit: 2/13/2018  Due back to clinic per last office note:  RTN in 6 months  Date next office visit scheduled:  Future Appointments  D

## 2018-03-01 RX ORDER — PHENYTOIN SODIUM 100 MG/1
CAPSULE, EXTENDED RELEASE ORAL
Qty: 90 CAPSULE | Refills: 0 | Status: SHIPPED | OUTPATIENT
Start: 2018-03-01 | End: 2018-04-16

## 2018-03-01 NOTE — TELEPHONE ENCOUNTER
Patient states that she 400 mg once a day for  another week and then she would be on 200 mg for 2 weeks and done.  Patient needs a refill

## 2018-03-05 ENCOUNTER — APPOINTMENT (OUTPATIENT)
Dept: PHYSICAL THERAPY | Age: 53
End: 2018-03-05
Attending: FAMILY MEDICINE
Payer: MEDICAID

## 2018-03-14 DIAGNOSIS — G72.49 INFLAMMATORY MYOPATHY: ICD-10-CM

## 2018-03-14 RX ORDER — HYDROCODONE BITARTRATE AND ACETAMINOPHEN 10; 325 MG/1; MG/1
1 TABLET ORAL EVERY 4 HOURS PRN
Qty: 120 TABLET | Refills: 0 | Status: SHIPPED | OUTPATIENT
Start: 2018-03-14 | End: 2018-04-12

## 2018-03-14 NOTE — TELEPHONE ENCOUNTER
LOV: 11/14/17   Last Refill:  2/14/18 #120 0 RF    Future Appointments  Date Time Provider Marsha Pang   7/30/2018 10:20 AM MD HOMER Carpio

## 2018-03-14 NOTE — TELEPHONE ENCOUNTER
Pt Needs a refill of the  HYDROcodone-acetaminophen  MG Oral Tab  Please return call to [phone]

## 2018-03-29 ENCOUNTER — APPOINTMENT (OUTPATIENT)
Dept: GENERAL RADIOLOGY | Age: 53
End: 2018-03-29
Attending: NURSE PRACTITIONER
Payer: MEDICAID

## 2018-03-29 ENCOUNTER — HOSPITAL ENCOUNTER (OUTPATIENT)
Age: 53
Discharge: HOME | End: 2018-03-29
Payer: MEDICAID

## 2018-03-29 VITALS
SYSTOLIC BLOOD PRESSURE: 136 MMHG | RESPIRATION RATE: 16 BRPM | BODY MASS INDEX: 34 KG/M2 | HEART RATE: 66 BPM | WEIGHT: 200 LBS | TEMPERATURE: 98 F | DIASTOLIC BLOOD PRESSURE: 82 MMHG | OXYGEN SATURATION: 96 %

## 2018-03-29 DIAGNOSIS — M25.552 PAIN OF LEFT HIP JOINT: ICD-10-CM

## 2018-03-29 DIAGNOSIS — M25.562 ACUTE PAIN OF LEFT KNEE: Primary | ICD-10-CM

## 2018-03-29 PROCEDURE — 99203 OFFICE O/P NEW LOW 30 MIN: CPT

## 2018-03-29 PROCEDURE — 99214 OFFICE O/P EST MOD 30 MIN: CPT

## 2018-03-29 PROCEDURE — 73560 X-RAY EXAM OF KNEE 1 OR 2: CPT | Performed by: NURSE PRACTITIONER

## 2018-03-29 PROCEDURE — 73502 X-RAY EXAM HIP UNI 2-3 VIEWS: CPT | Performed by: NURSE PRACTITIONER

## 2018-03-29 NOTE — ED PROVIDER NOTES
Patient Seen in: [address] [facility]    History   Patient presents with:  Lower Extremity Injury (musculoskeletal)    Stated Complaint: fell injured left knee x 1 day ago    61-year-old female who presents to the immediate care with complaint Review of Systems   Constitutional: Negative. HENT: Negative. Musculoskeletal:        Left hip and left knee pain   Skin: Negative. Hematological: Negative. Psychiatric/Behavioral: Negative. All other systems reviewed and are negative. 3/29/2018  PROCEDURE:  XR KNEE (1 OR 2 VIEWS), LEFT (CPT=73560)  COMPARISON:  None.   INDICATIONS:  fell injured left knee x 1 day ago  PATIENT STATED HISTORY: (As transcribed by Technologist)  Patient states she tripped on the pedals of her wheelchair last Patient's pain has improved with medications and  has no signs of neurovascular compromise or compartment syndrome.   I adressed with the patient the possibly of more significant ligamentous/soft tissue injury which will not be definitely identified at this

## 2018-03-29 NOTE — ED INITIAL ASSESSMENT (HPI)
Patient fell while getting out of her chair yesterday and twisted her left knee. Patient reports taking norco and phenytoin at 2pm today. Hx of surgery to left knee. Pt denies hitting head or loc.

## 2018-04-05 ENCOUNTER — TELEPHONE (OUTPATIENT)
Dept: NEUROLOGY | Facility: CLINIC | Age: 53
End: 2018-04-05

## 2018-04-05 DIAGNOSIS — R74.8 ELEVATED ALKALINE PHOSPHATASE LEVEL: Primary | ICD-10-CM

## 2018-04-06 NOTE — TELEPHONE ENCOUNTER
S: Rona Lucia attempting to wean off Dilantin as discussed    B: per Andreas Fountain 2/20: Told her to decrease the dilantin to 200mg daily x 2 weeks, then 100mg daily x 2 weeks then stop. She expressed full understanding.     A: reporting that she has tried to wean

## 2018-04-11 RX ORDER — CITALOPRAM 20 MG/1
TABLET ORAL
Qty: 90 TABLET | Refills: 3 | Status: SHIPPED | OUTPATIENT
Start: 2018-04-11 | End: 2019-03-22

## 2018-04-11 NOTE — TELEPHONE ENCOUNTER
LOV: 11/14/17  Last Refill: 3/29/17  #90 3 RF    Future Appointments  Date Time Provider Marsha Poly   7/30/2018 10:20 AM MD HOMER Santos [facility]

## 2018-04-12 DIAGNOSIS — G72.49 INFLAMMATORY MYOPATHY: ICD-10-CM

## 2018-04-12 RX ORDER — HYDROCODONE BITARTRATE AND ACETAMINOPHEN 10; 325 MG/1; MG/1
1 TABLET ORAL EVERY 4 HOURS PRN
Qty: 120 TABLET | Refills: 0 | Status: SHIPPED | OUTPATIENT
Start: 2018-04-12 | End: 2018-05-09

## 2018-04-12 NOTE — TELEPHONE ENCOUNTER
LOV: 11/14/17  Last Refill: 3/14/18  #120 0 RF    Future Appointments  Date Time Provider Marsha Pang   7/30/2018 10:20 AM MD HOMER Buck

## 2018-04-12 NOTE — TELEPHONE ENCOUNTER
Pt called, needs a refill on HYDROcodone-acetaminophen  MG Oral Tab. Pt's , Joanna Magana, will  script.    Please call pt at [phone]

## 2018-04-16 RX ORDER — PHENYTOIN SODIUM 100 MG/1
CAPSULE, EXTENDED RELEASE ORAL
Qty: 60 CAPSULE | Refills: 3 | Status: SHIPPED | OUTPATIENT
Start: 2018-04-16 | End: 2018-08-06

## 2018-04-16 NOTE — TELEPHONE ENCOUNTER
Medication: Dilantin 100 mg     Date of last refill: 01/29/18  [place]      Last office visit: 2/13/2018  Due back to clinic per last office note:  RTN in 6 months  Date next office visit scheduled:  Future Appointments

## 2018-04-16 NOTE — TELEPHONE ENCOUNTER
Casie Elkins, RN          12:12 PM   Note      S: Ashkan Eucha attempting to wean off Dilantin as discussed     B: per Valentine Ortiz, TE 2/20: Told her to decrease the dilantin to 200mg daily x 2 weeks, then 100mg daily x 2 weeks then stop.  She expressed full under

## 2018-04-23 RX ORDER — LEVOTHYROXINE SODIUM 0.15 MG/1
TABLET ORAL
Qty: 90 TABLET | Refills: 3 | Status: SHIPPED | OUTPATIENT
Start: 2018-04-23 | End: 2019-04-11

## 2018-04-24 NOTE — TELEPHONE ENCOUNTER
Patient is currently on the dilantin 100mg bid as she could not handle the spasms on 100mg daily. She has her appointment with Dr. Duane Collier this Friday. Will get a follow-up CMP. She expressed full understanding.

## 2018-04-27 ENCOUNTER — APPOINTMENT (OUTPATIENT)
Dept: LAB | Age: 53
End: 2018-04-27
Attending: PHYSICIAN ASSISTANT
Payer: MEDICAID

## 2018-04-27 DIAGNOSIS — R74.8 ELEVATED ALKALINE PHOSPHATASE LEVEL: ICD-10-CM

## 2018-04-27 PROCEDURE — 80053 COMPREHEN METABOLIC PANEL: CPT

## 2018-04-27 PROCEDURE — 36415 COLL VENOUS BLD VENIPUNCTURE: CPT

## 2018-04-30 ENCOUNTER — TELEPHONE (OUTPATIENT)
Dept: NEUROLOGY | Facility: CLINIC | Age: 53
End: 2018-04-30

## 2018-04-30 NOTE — TELEPHONE ENCOUNTER
Called and spoke with patient regarding her lab results. Informed of decrease in Alk phos level. Patient would like to speak with Toney Arthur so she can update her on her recent visit with physician at [facility].

## 2018-05-09 DIAGNOSIS — G72.49 INFLAMMATORY MYOPATHY: ICD-10-CM

## 2018-05-09 RX ORDER — HYDROCODONE BITARTRATE AND ACETAMINOPHEN 10; 325 MG/1; MG/1
1 TABLET ORAL EVERY 4 HOURS PRN
Qty: 120 TABLET | Refills: 0 | Status: SHIPPED | OUTPATIENT
Start: 2018-05-09 | End: 2018-06-06

## 2018-05-09 NOTE — TELEPHONE ENCOUNTER
LOV: 11/14/17   Last Refill: 4/12/18 #120 0 RF    Future Appointments  Date Time Provider Marsha Pang   7/30/2018 10:20 AM MD HOMER Pop

## 2018-05-09 NOTE — TELEPHONE ENCOUNTER
Pt needs a refill of the  HYDROcodone-acetaminophen  MG Oral Tab    Please return call to [phone]

## 2018-05-29 RX ORDER — FAMOTIDINE 20 MG/1
TABLET ORAL
Qty: 60 TABLET | Refills: 2 | Status: SHIPPED | OUTPATIENT
Start: 2018-05-29 | End: 2018-08-22

## 2018-06-06 ENCOUNTER — TELEPHONE (OUTPATIENT)
Dept: FAMILY MEDICINE CLINIC | Facility: CLINIC | Age: 53
End: 2018-06-06

## 2018-06-06 DIAGNOSIS — G72.49 INFLAMMATORY MYOPATHY: ICD-10-CM

## 2018-06-06 RX ORDER — HYDROCODONE BITARTRATE AND ACETAMINOPHEN 10; 325 MG/1; MG/1
1 TABLET ORAL EVERY 4 HOURS PRN
Qty: 120 TABLET | Refills: 0 | Status: SHIPPED | OUTPATIENT
Start: 2018-06-06 | End: 2018-07-02

## 2018-06-06 NOTE — TELEPHONE ENCOUNTER
LOV: 11/14/17   Last Refill: 5/9/18  #120 0 RF    Future Appointments  Date Time Provider Marsha Poly   7/30/2018 10:20 AM MD HOMER Barnes

## 2018-06-06 NOTE — TELEPHONE ENCOUNTER
Pt needs a refill of the   HYDROcodone-acetaminophen  MG Oral Tab  Please return call to [phone]

## 2018-06-12 NOTE — TELEPHONE ENCOUNTER
Patient did see Dr. Louis Karimi and he is reviewing her records. He is suppose to call her once he has finished reviewed her records. He did tell her that he did not feel that her symptoms fell into specific type of muscular dystrophy at this point.

## 2018-07-02 DIAGNOSIS — G72.49 INFLAMMATORY MYOPATHY: ICD-10-CM

## 2018-07-02 RX ORDER — HYDROCODONE BITARTRATE AND ACETAMINOPHEN 10; 325 MG/1; MG/1
1 TABLET ORAL EVERY 4 HOURS PRN
Qty: 120 TABLET | Refills: 0 | Status: SHIPPED | OUTPATIENT
Start: 2018-07-02 | End: 2018-07-30

## 2018-07-02 NOTE — TELEPHONE ENCOUNTER
LOV: 11/14/17  Last Refill: 6/6/18  #120 0 RF    Future Appointments  Date Time Provider Marsha Pang   7/30/2018 10:20 AM MD HOMER Cerna

## 2018-07-30 DIAGNOSIS — G72.49 INFLAMMATORY MYOPATHY: ICD-10-CM

## 2018-07-30 RX ORDER — HYDROCODONE BITARTRATE AND ACETAMINOPHEN 10; 325 MG/1; MG/1
1 TABLET ORAL EVERY 4 HOURS PRN
Qty: 120 TABLET | Refills: 0 | Status: SHIPPED | OUTPATIENT
Start: 2018-07-30 | End: 2018-08-27

## 2018-07-30 NOTE — TELEPHONE ENCOUNTER
Last refilled on 7/2/18 for # 120 with 0 refills  Last seen on 11/14/17  Future Appointments  Date Time Provider Marsha Pang   9/26/2018 1:40 PM MD SPRING Darden        Thank you.

## 2018-08-06 RX ORDER — PHENYTOIN SODIUM 100 MG/1
CAPSULE, EXTENDED RELEASE ORAL
Qty: 60 CAPSULE | Refills: 1 | Status: SHIPPED | OUTPATIENT
Start: 2018-08-06 | End: 2018-09-26

## 2018-08-06 NOTE — TELEPHONE ENCOUNTER
Medication: Dilantin 100 mg     Date of last refill: 04/16/18 with 3 addt refills  [place]      Last office visit: 2/13/2018  Due back to clinic per last office note:  RTN in 6 months  Date next office visit scheduled

## 2018-08-22 RX ORDER — FAMOTIDINE 20 MG/1
TABLET ORAL
Qty: 60 TABLET | Refills: 0 | Status: SHIPPED | OUTPATIENT
Start: 2018-08-22 | End: 2018-09-22

## 2018-08-22 NOTE — TELEPHONE ENCOUNTER
LOV: 11/14/17  Last Refill: 5/29/18 #60 2 RF    Future Appointments  Date Time Provider Marsha Pang   9/26/2018 1:40 PM MD SPRING Resendez

## 2018-08-27 DIAGNOSIS — G72.49 INFLAMMATORY MYOPATHY: ICD-10-CM

## 2018-08-27 RX ORDER — HYDROCODONE BITARTRATE AND ACETAMINOPHEN 10; 325 MG/1; MG/1
1 TABLET ORAL EVERY 4 HOURS PRN
Qty: 120 TABLET | Refills: 0 | Status: SHIPPED | OUTPATIENT
Start: 2018-08-27 | End: 2018-09-24

## 2018-08-27 NOTE — TELEPHONE ENCOUNTER
LOV: 11/14/17  Last Refill:7/30/18 #120 0 RF    Future Appointments  Date Time Provider Marsha Poly   9/26/2018 1:40 PM MD SPRING Santos

## 2018-08-27 NOTE — TELEPHONE ENCOUNTER
HYDROcodone-acetaminophen  MG Oral Tab 120 tablet 0 7/30/2018 8/29/2018   Sig :  Take 1 tablet by mouth every 4 (four) hours as needed for Pain. Route:   Oral       Patients , Linus Merchant, will  script and will bring his ID.

## 2018-09-24 ENCOUNTER — TELEPHONE (OUTPATIENT)
Dept: FAMILY MEDICINE CLINIC | Facility: CLINIC | Age: 53
End: 2018-09-24

## 2018-09-24 DIAGNOSIS — G72.49 INFLAMMATORY MYOPATHY: ICD-10-CM

## 2018-09-24 RX ORDER — FAMOTIDINE 20 MG/1
TABLET ORAL
Qty: 180 TABLET | Refills: 2 | Status: SHIPPED | OUTPATIENT
Start: 2018-09-24 | End: 2019-06-22

## 2018-09-24 RX ORDER — HYDROCODONE BITARTRATE AND ACETAMINOPHEN 10; 325 MG/1; MG/1
1 TABLET ORAL EVERY 4 HOURS PRN
Qty: 120 TABLET | Refills: 0 | Status: SHIPPED | OUTPATIENT
Start: 2018-09-24 | End: 2018-09-26

## 2018-09-24 NOTE — TELEPHONE ENCOUNTER
LOV: 11/14/17   Last Refill: 8/27/18 #120 0 RF    Future Appointments   Date Time Provider Marsha Pang   9/26/2018  1:40 PM MD SPRING Stock

## 2018-09-24 NOTE — TELEPHONE ENCOUNTER
Pt called, needs a refill on HYDROcodone-acetaminophen  MG Oral Tab. Pt or her , Valeria Christine, will  script.    Please call pt at [phone]

## 2018-09-26 ENCOUNTER — OFFICE VISIT (OUTPATIENT)
Dept: NEUROLOGY | Facility: CLINIC | Age: 53
End: 2018-09-26
Payer: MEDICAID

## 2018-09-26 VITALS — DIASTOLIC BLOOD PRESSURE: 68 MMHG | HEART RATE: 68 BPM | SYSTOLIC BLOOD PRESSURE: 124 MMHG | RESPIRATION RATE: 16 BRPM

## 2018-09-26 DIAGNOSIS — G71.02 MUSCULAR DYSTROPHY, FACIOSCAPULOHUMERAL (HCC): Primary | ICD-10-CM

## 2018-09-26 PROCEDURE — 99213 OFFICE O/P EST LOW 20 MIN: CPT | Performed by: OTHER

## 2018-09-26 RX ORDER — PHENYTOIN SODIUM 100 MG/1
CAPSULE, EXTENDED RELEASE ORAL
Qty: 180 CAPSULE | Refills: 3 | Status: SHIPPED | OUTPATIENT
Start: 2018-09-26 | End: 2019-10-05

## 2018-09-26 RX ORDER — HYDROCODONE BITARTRATE AND ACETAMINOPHEN 10; 325 MG/1; MG/1
1 TABLET ORAL
COMMUNITY
End: 2018-10-22

## 2018-09-26 NOTE — PATIENT INSTRUCTIONS
Refill policies:    • Allow 2-3 business days for refills; controlled substances may take longer.   • Contact your pharmacy at least 5 days prior to running out of medication and have them send an electronic request or submit request through the “request re entire amount billed. Precertification and Prior Authorizations: If your physician has recommended that you have a procedure or additional testing performed.   [facility]) will contact your insurance carrier to obtain pre-certi

## 2018-09-26 NOTE — PROGRESS NOTES
[facility] with [address]  9/17/1965  Primary Care Provider:  Keya Owens DO    9/26/2018  Accompanied visit:  (x) No ( ) yes    48year old yo patient being seen for:  Bhavya [Isomethepte*    ANAPHYLAXIS  Pcn [Penicillins]       HIVES, NAUSEA AND VOMITING  Codeine                 NAUSEA AND VOMITING      During today's visit, the following items were reviewed: medications, and the following relevant information are as noted in F2F  (  ) Case/studies discussed with other caregivers - -non F2F  (  ) Telephone time with patiern or authorized [employer]  (  ) other records reviewed --non F2F  (  ) Fam meetings - patient not present --non F2F  Non Face to Face CPT code 42104

## 2018-10-19 ENCOUNTER — TELEPHONE (OUTPATIENT)
Dept: FAMILY MEDICINE CLINIC | Facility: CLINIC | Age: 53
End: 2018-10-19

## 2018-10-19 NOTE — TELEPHONE ENCOUNTER
Call from patient. States she has been taking norco QID, but lately she has been taking it 5 times daily since she has been getting up earlier to take care of her granddaughter.  So she runs out of tablets sooner and can't get it refilled since it is always

## 2018-10-22 DIAGNOSIS — G70.00 MYASTHENIA GRAVIS (HCC): Primary | ICD-10-CM

## 2018-10-22 DIAGNOSIS — G89.29 NECK PAIN, CHRONIC: ICD-10-CM

## 2018-10-22 DIAGNOSIS — M54.2 NECK PAIN, CHRONIC: ICD-10-CM

## 2018-10-22 RX ORDER — HYDROCODONE BITARTRATE AND ACETAMINOPHEN 10; 325 MG/1; MG/1
1 TABLET ORAL EVERY 4 HOURS PRN
Qty: 150 TABLET | Refills: 0 | Status: SHIPPED | OUTPATIENT
Start: 2018-10-22 | End: 2018-11-20

## 2018-10-22 NOTE — TELEPHONE ENCOUNTER
Dr. Glenna Edge  . [facility] [address] [place] [address] Rd   Phone: [phone]   Fax: [phone]      LM for pt to call back    Medication in folder

## 2018-10-22 NOTE — TELEPHONE ENCOUNTER
So I would like Duke Guzman to set up an appt with the pain management folks to see if there eis something better we can do for her pain, she;'s approaching quantities of medication that are getting a little dicey and maybe there is something different we can d

## 2018-10-30 ENCOUNTER — OFFICE VISIT (OUTPATIENT)
Dept: PAIN CLINIC | Facility: CLINIC | Age: 53
End: 2018-10-30
Payer: MEDICAID

## 2018-10-30 VITALS
HEART RATE: 48 BPM | DIASTOLIC BLOOD PRESSURE: 78 MMHG | OXYGEN SATURATION: 97 % | HEIGHT: 65 IN | SYSTOLIC BLOOD PRESSURE: 116 MMHG | BODY MASS INDEX: 33 KG/M2

## 2018-10-30 DIAGNOSIS — G71.02 MUSCULAR DYSTROPHY, FACIOSCAPULOHUMERAL (HCC): Primary | ICD-10-CM

## 2018-10-30 PROCEDURE — 99203 OFFICE O/P NEW LOW 30 MIN: CPT | Performed by: ANESTHESIOLOGY

## 2018-10-30 RX ORDER — DIPHENHYDRAMINE HCL 25 MG
50 CAPSULE ORAL
COMMUNITY

## 2018-10-30 RX ORDER — NALOXONE HYDROCHLORIDE 4 MG/.1ML
4 SPRAY, METERED NASAL AS NEEDED
Qty: 1 KIT | Refills: 0 | Status: SHIPPED | OUTPATIENT
Start: 2018-10-30 | End: 2021-05-27

## 2018-10-30 NOTE — PROGRESS NOTES
Physical Exam   Constitutional:              OA#[id number]    New patient here reporting MS pain in entire body, pt reports that pain locations varies   Last dose of Norco and Dilantan at 1300.    In room with  and daughter, ok to hear PHI    Location of

## 2018-10-30 NOTE — H&P
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   Motor Examination:    (R)   (L)  Deltoid:       3    3  Biceps:       4    4  Triceps:      4    4  Wrist Extension:     5    5  Wrist Flexsion:                 5    5  Finger Extension:     5    5  Finger Flexsion:      5    5    Neurologic: there is any role for injection therapy. She can follow-up on as-needed basis.       Saeed Alvarado MD  Pain Management

## 2018-11-20 RX ORDER — HYDROCODONE BITARTRATE AND ACETAMINOPHEN 10; 325 MG/1; MG/1
1 TABLET ORAL EVERY 4 HOURS PRN
Qty: 150 TABLET | Refills: 0 | Status: SHIPPED | OUTPATIENT
Start: 2018-11-20 | End: 2018-12-20

## 2018-11-24 NOTE — TELEPHONE ENCOUNTER
Last refilled on 12/13/17 for # 90 with 3 refills  Last OV 11/14/17, /78 on 10/30/18  No future appointments. Thank you.

## 2018-12-20 RX ORDER — HYDROCODONE BITARTRATE AND ACETAMINOPHEN 10; 325 MG/1; MG/1
1 TABLET ORAL EVERY 4 HOURS PRN
Qty: 150 TABLET | Refills: 0 | Status: SHIPPED | OUTPATIENT
Start: 2018-12-20 | End: 2019-01-17

## 2018-12-20 NOTE — TELEPHONE ENCOUNTER
Pt called, needs refill on HYDROcodone-acetaminophen  MG Oral Tab. Pt's  will  script.   Pleases call pt at [phone]

## 2019-01-17 ENCOUNTER — TELEPHONE (OUTPATIENT)
Dept: FAMILY MEDICINE CLINIC | Facility: CLINIC | Age: 54
End: 2019-01-17

## 2019-01-17 RX ORDER — HYDROCODONE BITARTRATE AND ACETAMINOPHEN 10; 325 MG/1; MG/1
1 TABLET ORAL EVERY 4 HOURS PRN
Qty: 150 TABLET | Refills: 0 | Status: SHIPPED | OUTPATIENT
Start: 2019-01-17 | End: 2019-02-15

## 2019-01-17 NOTE — TELEPHONE ENCOUNTER
Pt called, needs a refill on HYDROcodone-acetaminophen  MG Oral Tab. Pt's , Christofer Reno, will  script. Please call pt at [phone].

## 2019-02-12 ENCOUNTER — TELEPHONE (OUTPATIENT)
Dept: FAMILY MEDICINE CLINIC | Facility: CLINIC | Age: 54
End: 2019-02-12

## 2019-02-12 DIAGNOSIS — Z12.11 SCREENING FOR MALIGNANT NEOPLASM OF COLON: Primary | ICD-10-CM

## 2019-02-13 RX ORDER — BACLOFEN 10 MG/1
TABLET ORAL
Qty: 225 TABLET | Refills: 3 | Status: SHIPPED | OUTPATIENT
Start: 2019-02-13 | End: 2020-01-27

## 2019-02-15 RX ORDER — HYDROCODONE BITARTRATE AND ACETAMINOPHEN 10; 325 MG/1; MG/1
1 TABLET ORAL EVERY 4 HOURS PRN
Qty: 150 TABLET | Refills: 0 | Status: SHIPPED | OUTPATIENT
Start: 2019-02-15 | End: 2019-03-15

## 2019-02-15 NOTE — TELEPHONE ENCOUNTER
Last refilled on 1/17/19 for # 150 with 0 refills  Last OV 11/14/17  No future appointments. Thank you.

## 2019-02-15 NOTE — TELEPHONE ENCOUNTER
Patient's spouse Miryam Rush picked up script for [facility] 10/325 mg on 02/15/19, [place] # [phone]

## 2019-02-15 NOTE — TELEPHONE ENCOUNTER
Pt needs a refill of the  HYDROcodone-acetaminophen  MG Oral Tab    Please return call to [phone]

## 2019-03-15 RX ORDER — HYDROCODONE BITARTRATE AND ACETAMINOPHEN 10; 325 MG/1; MG/1
1 TABLET ORAL EVERY 4 HOURS PRN
Qty: 150 TABLET | Refills: 0 | Status: SHIPPED | OUTPATIENT
Start: 2019-03-15 | End: 2019-04-12

## 2019-03-15 NOTE — TELEPHONE ENCOUNTER
Script printed and signed by provider. Patient notified and verbalized understanding. States her  will .     Script placed in  folder

## 2019-03-15 NOTE — TELEPHONE ENCOUNTER
HYDROcodone-acetaminophen  MG Oral Tab 150 tablet 0 2/15/2019    Sig :  Take 1 tablet by mouth every 4 (four) hours as needed for Pain. The quantity dispensed should last 30 days.  Brenda Munoz is responsible for safekeeping of all of her unfilled

## 2019-03-18 ENCOUNTER — PATIENT OUTREACH (OUTPATIENT)
Dept: FAMILY MEDICINE CLINIC | Facility: CLINIC | Age: 54
End: 2019-03-18

## 2019-03-22 RX ORDER — CITALOPRAM 20 MG/1
TABLET ORAL
Qty: 90 TABLET | Refills: 3 | Status: SHIPPED | OUTPATIENT
Start: 2019-03-22 | End: 2020-03-16

## 2019-03-22 RX ORDER — CITALOPRAM 20 MG/1
TABLET ORAL
Qty: 90 TABLET | Refills: 0 | OUTPATIENT
Start: 2019-03-22 | End: 2019-06-20

## 2019-03-22 NOTE — TELEPHONE ENCOUNTER
Citalopram refilled 3/22/19 #90 with 3 refills to [facility] on [address]. This request refused.

## 2019-04-11 RX ORDER — LEVOTHYROXINE SODIUM 0.15 MG/1
TABLET ORAL
Qty: 90 TABLET | Refills: 0 | Status: SHIPPED | OUTPATIENT
Start: 2019-04-11 | End: 2019-07-10

## 2019-04-11 NOTE — TELEPHONE ENCOUNTER
LOV: 11/14/17  Last Refill: 4/23/18 #90 3 RF    Last Labs: 4/11/17 TSH 0.443    No future appointments.

## 2019-04-12 RX ORDER — HYDROCODONE BITARTRATE AND ACETAMINOPHEN 10; 325 MG/1; MG/1
1 TABLET ORAL EVERY 4 HOURS PRN
Qty: 150 TABLET | Refills: 0 | Status: SHIPPED | OUTPATIENT
Start: 2019-04-12 | End: 2019-05-14

## 2019-04-12 NOTE — TELEPHONE ENCOUNTER
HYDROcodone-acetaminophen  MG Oral Tab 150 tablet 0 3/15/2019    Sig:   Take 1 tablet by mouth every 4 (four) hours as needed for Pain. The quantity dispensed should last 30 days.  Luzma Mccormick is responsible for safekeeping of all of her unfilled

## 2019-05-14 RX ORDER — HYDROCODONE BITARTRATE AND ACETAMINOPHEN 10; 325 MG/1; MG/1
1 TABLET ORAL EVERY 4 HOURS PRN
Qty: 150 TABLET | Refills: 0 | Status: SHIPPED | OUTPATIENT
Start: 2019-05-14 | End: 2019-06-13

## 2019-05-14 NOTE — TELEPHONE ENCOUNTER
LOV: 11/14/17   Last Refill: 4/12/19 #150 0 RF    Future Appointments   Date Time Provider Marsha Pang   6/3/2019  5:00 PM DO Ilya Castro [address] [facility] [place] [place]

## 2019-05-21 ENCOUNTER — TELEPHONE (OUTPATIENT)
Dept: FAMILY MEDICINE CLINIC | Facility: CLINIC | Age: 54
End: 2019-05-21

## 2019-05-21 NOTE — TELEPHONE ENCOUNTER
Pt advised it is fine to do both visits at that time- verbalized understanding    Future Appointments   Date Time Provider Marsha Pang   6/3/2019  5:00 PM Joel Ivory DO [facility] MARIA ELENA Clark

## 2019-05-21 NOTE — TELEPHONE ENCOUNTER
FYI:    PT CALLED TO ADV THAT SHE WAS ADMITTED TO [facility] ON 5/16/19 FOR CHEST PAIN. WAS DISCHARGED AND WAS ADV TO SCHEDULE F/U.    PT HAS PX APT ON 6/3/19. CAN PT COMBINE BOTH APTS?      PLEASE REQUEST RECORDS    THANK YOU

## 2019-06-03 ENCOUNTER — OFFICE VISIT (OUTPATIENT)
Dept: FAMILY MEDICINE CLINIC | Facility: CLINIC | Age: 54
End: 2019-06-03
Payer: MEDICAID

## 2019-06-03 VITALS
RESPIRATION RATE: 16 BRPM | TEMPERATURE: 98 F | HEART RATE: 60 BPM | DIASTOLIC BLOOD PRESSURE: 86 MMHG | SYSTOLIC BLOOD PRESSURE: 132 MMHG

## 2019-06-03 DIAGNOSIS — E03.9 ACQUIRED HYPOTHYROIDISM: ICD-10-CM

## 2019-06-03 DIAGNOSIS — M62.838 MUSCLE SPASM: ICD-10-CM

## 2019-06-03 DIAGNOSIS — Z00.00 ROUTINE HEALTH MAINTENANCE: Primary | ICD-10-CM

## 2019-06-03 DIAGNOSIS — G71.02 MUSCULAR DYSTROPHY, FACIOSCAPULOHUMERAL (HCC): ICD-10-CM

## 2019-06-03 PROCEDURE — 84443 ASSAY THYROID STIM HORMONE: CPT | Performed by: FAMILY MEDICINE

## 2019-06-03 PROCEDURE — 99396 PREV VISIT EST AGE 40-64: CPT | Performed by: FAMILY MEDICINE

## 2019-06-03 PROCEDURE — 83735 ASSAY OF MAGNESIUM: CPT | Performed by: FAMILY MEDICINE

## 2019-06-03 PROCEDURE — 1111F DSCHRG MED/CURRENT MED MERGE: CPT | Performed by: FAMILY MEDICINE

## 2019-06-03 PROCEDURE — 84481 FREE ASSAY (FT-3): CPT | Performed by: FAMILY MEDICINE

## 2019-06-03 PROCEDURE — 84439 ASSAY OF FREE THYROXINE: CPT | Performed by: FAMILY MEDICINE

## 2019-06-03 RX ORDER — GABAPENTIN 100 MG/1
100 CAPSULE ORAL 2 TIMES DAILY
Qty: 60 CAPSULE | Refills: 1 | Status: SHIPPED | OUTPATIENT
Start: 2019-06-03 | End: 2019-07-10

## 2019-06-03 NOTE — PROGRESS NOTES
HPI:   Jocelyn Vieyra is a 48year old female who presents for a complete physical exam. Symptoms: denies discharge, itching, burning or dysuria.  Patient complains of Nothing except for having significant chest pain, after she was admitted to [facility] where Disp: 150 tablet Rfl: 0   LEVOTHYROXINE SODIUM 150 MCG Oral Tab TAKE 1 TABLET BY MOUTH EVERY MORNING BEFORE BREAKFAST Disp: 90 tablet Rfl: 0   CITALOPRAM HYDROBROMIDE 20 MG Oral Tab TAKE 1 TABLET BY MOUTH EVERY DAY Disp: 90 tablet Rfl: 3   DiphenhydrAMINE Smoking status: Former Smoker        Packs/day: 0.50        Years: 15.00        Pack years: 7.5        Quit date: 2013        Years since quittin.7      Smokeless tobacco: Former User    Alcohol use: No      Alcohol/week: 0.0 oz    Drug use:  No bicep flexion and extension    ASSESSMENT AND PLAN:   Levi Connelly is a 48year old female who presents for a complete physical exam. NO Pap and pelvic done. NOT SURE HOW SHE COULD HAVE MAMMOGRAM, DUE TO COMPLETLEY W/C BOUND Self breast exam explained.

## 2019-06-04 ENCOUNTER — TELEPHONE (OUTPATIENT)
Dept: FAMILY MEDICINE CLINIC | Facility: CLINIC | Age: 54
End: 2019-06-04

## 2019-06-04 DIAGNOSIS — E03.9 HYPOTHYROIDISM, UNSPECIFIED TYPE: Primary | ICD-10-CM

## 2019-06-04 RX ORDER — LEVOTHYROXINE SODIUM 0.1 MG/1
100 TABLET ORAL
Qty: 30 TABLET | Refills: 0 | Status: SHIPPED | OUTPATIENT
Start: 2019-06-04 | End: 2019-07-02

## 2019-06-13 RX ORDER — HYDROCODONE BITARTRATE AND ACETAMINOPHEN 10; 325 MG/1; MG/1
1 TABLET ORAL EVERY 4 HOURS PRN
Qty: 150 TABLET | Refills: 0 | Status: SHIPPED | OUTPATIENT
Start: 2019-06-13 | End: 2019-07-12

## 2019-06-13 NOTE — TELEPHONE ENCOUNTER
Pt called, needs refill on HYDROcodone-acetaminophen  MG Oral Tab. Pt's , Anat Weeks, will  script.   Please call pt at [phone]

## 2019-06-13 NOTE — TELEPHONE ENCOUNTER
Left detailed message advising script ready for pickup, office hours and # and to have  bring photo ID. Ok per [employer] form.

## 2019-06-13 NOTE — TELEPHONE ENCOUNTER
Last refilled on 5/14/19 for # 150 with 0 refills  Last OV 6/3/19  No future appointments. Thank you.

## 2019-06-22 RX ORDER — FAMOTIDINE 20 MG/1
TABLET ORAL
Qty: 180 TABLET | Refills: 2 | Status: SHIPPED | OUTPATIENT
Start: 2019-06-22 | End: 2020-03-16

## 2019-06-22 NOTE — TELEPHONE ENCOUNTER
Last refilled on 9/24/18 for # 180 with 2 refills  Last OV 6/3/19  No future appointments. Thank you.

## 2019-07-02 RX ORDER — LEVOTHYROXINE SODIUM 0.1 MG/1
TABLET ORAL
Qty: 30 TABLET | Refills: 5 | Status: SHIPPED | OUTPATIENT
Start: 2019-07-02 | End: 2019-12-30

## 2019-07-05 ENCOUNTER — TELEPHONE (OUTPATIENT)
Dept: FAMILY MEDICINE CLINIC | Facility: CLINIC | Age: 54
End: 2019-07-05

## 2019-07-05 NOTE — TELEPHONE ENCOUNTER
Letter mailed to patient reminding her she is due for labs.     Lab Frequency Next Occurrence   ASSAY, THYROID STIM HORMONE Once 07/04/2019

## 2019-07-10 RX ORDER — GABAPENTIN 100 MG/1
CAPSULE ORAL
Qty: 60 CAPSULE | Refills: 5 | Status: SHIPPED | OUTPATIENT
Start: 2019-07-10 | End: 2020-02-19

## 2019-07-10 RX ORDER — LEVOTHYROXINE SODIUM 0.15 MG/1
TABLET ORAL
Qty: 90 TABLET | Refills: 0 | OUTPATIENT
Start: 2019-07-10 | End: 2019-10-08

## 2019-07-12 ENCOUNTER — TELEPHONE (OUTPATIENT)
Dept: FAMILY MEDICINE CLINIC | Facility: CLINIC | Age: 54
End: 2019-07-12

## 2019-07-12 RX ORDER — HYDROCODONE BITARTRATE AND ACETAMINOPHEN 10; 325 MG/1; MG/1
1 TABLET ORAL EVERY 4 HOURS PRN
Qty: 20 TABLET | Refills: 0 | Status: SHIPPED | OUTPATIENT
Start: 2019-07-12 | End: 2019-07-16

## 2019-07-12 NOTE — TELEPHONE ENCOUNTER
Pt called, needs refill on HYDROcodone-acetaminophen  MG Oral Tab. Pt's , Janette El, will pick script. Pt states she will be out of medication tomorrow.    Please call pt at [phone]

## 2019-07-12 NOTE — TELEPHONE ENCOUNTER
Patient advised of the information per Dr. Josi Cardona. Patients  will be picking up prescription.  Advised to make sure he brings in his identification

## 2019-07-16 RX ORDER — HYDROCODONE BITARTRATE AND ACETAMINOPHEN 10; 325 MG/1; MG/1
1 TABLET ORAL EVERY 4 HOURS PRN
Qty: 150 TABLET | Refills: 0 | Status: SHIPPED | OUTPATIENT
Start: 2019-07-16 | End: 2019-08-12

## 2019-07-16 NOTE — TELEPHONE ENCOUNTER
Patients spouse Breanne Sandoval picked up script for [address]  mg on 07/16/19, [place] #[phone]

## 2019-07-16 NOTE — TELEPHONE ENCOUNTER
Pt got partial script for Hydrocodone on Friday- pt is now out of meds. Can pt get full refill.     Pt was advised mediation will be available this afternoon for

## 2019-08-05 ENCOUNTER — TELEPHONE (OUTPATIENT)
Dept: FAMILY MEDICINE CLINIC | Facility: CLINIC | Age: 54
End: 2019-08-05

## 2019-08-12 NOTE — TELEPHONE ENCOUNTER
PT CALLED AND ADV NEEDS REFILL OF     HYDROcodone-acetaminophen  MG Oral Tab    PLEASE CALL AND ADV WHEN READY FOR P/U     THANK YOU    (ADV  OUT OF OFFICE UNTIL TOMORROW) PT V/U

## 2019-08-12 NOTE — TELEPHONE ENCOUNTER
LOV: 6/3/19   Last Refill: 7/16/19 #150 0 RF    No future appointments.     Pt is aware DS is out of office until Tuesday

## 2019-08-13 RX ORDER — HYDROCODONE BITARTRATE AND ACETAMINOPHEN 10; 325 MG/1; MG/1
1 TABLET ORAL EVERY 4 HOURS PRN
Qty: 150 TABLET | Refills: 0 | Status: SHIPPED | OUTPATIENT
Start: 2019-08-13 | End: 2019-09-09

## 2019-08-13 NOTE — TELEPHONE ENCOUNTER
Spouse [facility] picked up script on 08/13/19, [facility] # [phone]

## 2019-09-09 ENCOUNTER — TELEPHONE (OUTPATIENT)
Dept: FAMILY MEDICINE CLINIC | Facility: CLINIC | Age: 54
End: 2019-09-09

## 2019-09-09 RX ORDER — HYDROCODONE BITARTRATE AND ACETAMINOPHEN 10; 325 MG/1; MG/1
1 TABLET ORAL EVERY 4 HOURS PRN
Qty: 150 TABLET | Refills: 0 | Status: SHIPPED | OUTPATIENT
Start: 2019-09-09 | End: 2019-10-07

## 2019-09-09 NOTE — TELEPHONE ENCOUNTER
HYDROcodone-acetaminophen  MG Oral Tab    Spouse Willy Thomas will  script and bring his ID with him.

## 2019-10-07 RX ORDER — HYDROCODONE BITARTRATE AND ACETAMINOPHEN 10; 325 MG/1; MG/1
1 TABLET ORAL EVERY 4 HOURS PRN
Qty: 150 TABLET | Refills: 0 | Status: SHIPPED | OUTPATIENT
Start: 2019-10-07 | End: 2019-11-06

## 2019-10-07 NOTE — TELEPHONE ENCOUNTER
Medication: Phenytoin 100 mg    Date of last refill:09/26/2018 with 3 addt refills  [place]     Last office visit: 09/26/2018  Due back to clinic per last office note:  RTN in 1 year by 09/26/2019  Date next office visit Return in about 1 year (around 9/26/2019).

## 2019-10-07 NOTE — TELEPHONE ENCOUNTER
Refill-HYDROcodone-acetaminophen  MG Oral Tab. Pharmacy-[facility] [address] [place].   Please call pt at [phone]

## 2019-10-08 RX ORDER — PHENYTOIN SODIUM 100 MG/1
CAPSULE, EXTENDED RELEASE ORAL
Qty: 60 CAPSULE | Refills: 0 | Status: SHIPPED | OUTPATIENT
Start: 2019-10-08 | End: 2019-11-05

## 2019-11-05 RX ORDER — PHENYTOIN SODIUM 100 MG/1
CAPSULE, EXTENDED RELEASE ORAL
Qty: 60 CAPSULE | Refills: 2 | Status: SHIPPED | OUTPATIENT
Start: 2019-11-05 | End: 2020-01-27

## 2019-11-05 NOTE — TELEPHONE ENCOUNTER
Medication: Phenytoin 100 mg     Date of last refill:10/08/2019       Last office visit: 09/26/2018  Due back to clinic per last office note:  RTN in 1 year by 09/26/2019  Date next office visit scheduled:  No future appointments.     Last OV note recommen

## 2019-11-06 RX ORDER — HYDROCODONE BITARTRATE AND ACETAMINOPHEN 10; 325 MG/1; MG/1
1 TABLET ORAL EVERY 4 HOURS PRN
Qty: 150 TABLET | Refills: 0 | Status: SHIPPED | OUTPATIENT
Start: 2019-11-06 | End: 2019-12-02

## 2019-12-02 RX ORDER — HYDROCODONE BITARTRATE AND ACETAMINOPHEN 10; 325 MG/1; MG/1
1 TABLET ORAL EVERY 4 HOURS PRN
Qty: 150 TABLET | Refills: 0 | Status: SHIPPED | OUTPATIENT
Start: 2019-12-02 | End: 2019-12-30

## 2019-12-30 RX ORDER — HYDROCODONE BITARTRATE AND ACETAMINOPHEN 10; 325 MG/1; MG/1
1 TABLET ORAL EVERY 4 HOURS PRN
Qty: 150 TABLET | Refills: 0 | Status: SHIPPED | OUTPATIENT
Start: 2019-12-30 | End: 2020-01-28

## 2019-12-30 RX ORDER — LEVOTHYROXINE SODIUM 0.1 MG/1
TABLET ORAL
Qty: 30 TABLET | Refills: 5 | Status: SHIPPED | OUTPATIENT
Start: 2019-12-30 | End: 2020-06-21

## 2019-12-30 NOTE — TELEPHONE ENCOUNTER
LOV: 6/3/19  Last Refill: 7/2/19 #30 5 RF    No future appointments.     Lab Results   Component Value Date    T4F 1.4 06/03/2019    TSH 0.006 (L) 06/03/2019

## 2019-12-30 NOTE — TELEPHONE ENCOUNTER
Pt called, needs refill on HYDROcodone-acetaminophen  MG Oral Tab. Pharmacy-[facility] [address] [place]. Please call pt at [phone].

## 2020-01-27 RX ORDER — BACLOFEN 10 MG/1
TABLET ORAL
Qty: 225 TABLET | Refills: 3 | Status: SHIPPED | OUTPATIENT
Start: 2020-01-27 | End: 2021-09-13

## 2020-01-27 RX ORDER — PHENYTOIN SODIUM 100 MG/1
CAPSULE, EXTENDED RELEASE ORAL
Qty: 60 CAPSULE | Refills: 1 | Status: SHIPPED | OUTPATIENT
Start: 2020-01-27 | End: 2020-04-01

## 2020-01-27 NOTE — TELEPHONE ENCOUNTER
LOV: 6/3/19  Last Refill: 2/13/19 225 3 RF    Future Appointments   Date Time Provider Marsha Pang   3/11/2020  1:40 PM MD SPRING Camacho protocol for medication

## 2020-01-27 NOTE — TELEPHONE ENCOUNTER
Medication: Phenytoin 100 mg     Date of last refill:11/05/2019 with 2 addt refills        Last office visit: 09/26/2018  Due back to clinic per last office note:  RTN in 1 year by 09/26/2019  Date next office visit scheduled:  No future appointments.

## 2020-01-28 RX ORDER — HYDROCODONE BITARTRATE AND ACETAMINOPHEN 10; 325 MG/1; MG/1
1 TABLET ORAL EVERY 4 HOURS PRN
Qty: 150 TABLET | Refills: 0 | Status: SHIPPED | OUTPATIENT
Start: 2020-01-28 | End: 2020-02-24

## 2020-01-28 NOTE — TELEPHONE ENCOUNTER
LOV: 6/3/19   Last Refill: 12/30/19 #150 0 RF    Future Appointments   Date Time Provider Marsha Pang   3/11/2020  1:40 PM MD SPRING [facility] Ele Salazar

## 2020-01-28 NOTE — TELEPHONE ENCOUNTER
HYDROcodone-acetaminophen  MG Oral Tab    [facility] #[id number] [facility], [address] [place] [address] OF [facility], [facility] [address], [phone], [phone]

## 2020-02-11 NOTE — TELEPHONE ENCOUNTER
Hypertension Medications Protocol Failed2/11 12:49 PM   CMP or BMP in past 12 months    Appointment in past 6 or next 3 months    Last serum creatinine< 2.0     LOV: 6/3/19   Last Refill: 11/18/19 #90 0 RF    Future Appointments   Date Time Provider Depart

## 2020-02-17 ENCOUNTER — TELEPHONE (OUTPATIENT)
Dept: FAMILY MEDICINE CLINIC | Facility: CLINIC | Age: 55
End: 2020-02-17

## 2020-02-17 NOTE — TELEPHONE ENCOUNTER
Pt is coming in 2-19-20 for an ER Follow up, She was see at [facility] yesterday for Headaches and chest pressure, She was sent home.

## 2020-02-19 ENCOUNTER — OFFICE VISIT (OUTPATIENT)
Dept: FAMILY MEDICINE CLINIC | Facility: CLINIC | Age: 55
End: 2020-02-19
Payer: MEDICAID

## 2020-02-19 VITALS
RESPIRATION RATE: 12 BRPM | TEMPERATURE: 97 F | SYSTOLIC BLOOD PRESSURE: 124 MMHG | DIASTOLIC BLOOD PRESSURE: 84 MMHG | HEART RATE: 60 BPM

## 2020-02-19 DIAGNOSIS — G44.059 HEADACHE , SHORT UNILAT NEURALGIFORM, W/CONJUNCTIVAL INJECTION/TEARING: ICD-10-CM

## 2020-02-19 DIAGNOSIS — J06.9 VIRAL URI: Primary | ICD-10-CM

## 2020-02-19 DIAGNOSIS — R05.9 COUGH: ICD-10-CM

## 2020-02-19 PROCEDURE — 99213 OFFICE O/P EST LOW 20 MIN: CPT | Performed by: FAMILY MEDICINE

## 2020-02-19 RX ORDER — CHOLECALCIFEROL (VITAMIN D3) 125 MCG
5 CAPSULE ORAL
COMMUNITY
End: 2020-09-23

## 2020-02-19 RX ORDER — DOXYCYCLINE HYCLATE 100 MG/1
100 CAPSULE ORAL
COMMUNITY
Start: 2020-02-16 | End: 2020-09-23 | Stop reason: ALTCHOICE

## 2020-02-19 RX ORDER — ONDANSETRON 4 MG/1
TABLET, ORALLY DISINTEGRATING ORAL
COMMUNITY
Start: 2019-12-21 | End: 2021-05-27

## 2020-02-19 NOTE — PROGRESS NOTES
HPI:   Will Nava is a 48year old female who presents for upper respiratory symptoms for  3  days.  Patient reports congestion, sinus pain, had a severe headache and eye pressure and pain, .she tested negative for strep and the flu, not sure she had Medical History:   Diagnosis Date   • MD (muscular dystrophy) [facility])       Past Surgical History:   Procedure Laterality Date   • CHOLECYSTECTOMY     • ENDOMETRIAL ABLATION     • EXTREMITY LOWER MUSCLE BIOPSY Left 9/24/2013    Performed by Justin G[facility] Prescriptions      No prescriptions requested or ordered in this encounter       Imaging & Consults:  None

## 2020-02-24 RX ORDER — HYDROCODONE BITARTRATE AND ACETAMINOPHEN 10; 325 MG/1; MG/1
1 TABLET ORAL EVERY 4 HOURS PRN
Qty: 150 TABLET | Refills: 0 | Status: SHIPPED | OUTPATIENT
Start: 2020-02-24 | End: 2020-03-20

## 2020-02-24 NOTE — TELEPHONE ENCOUNTER
LOV: 02/19/20   Last Refill: 01/28/20 #150 0 RF    Future Appointments   Date Time Provider Marsha Poly   3/11/2020  1:40 PM MD SPR[facility]

## 2020-03-16 RX ORDER — FAMOTIDINE 20 MG/1
TABLET ORAL
Qty: 180 TABLET | Refills: 0 | Status: SHIPPED | OUTPATIENT
Start: 2020-03-16 | End: 2020-06-21

## 2020-03-16 RX ORDER — CITALOPRAM 20 MG/1
TABLET ORAL
Qty: 90 TABLET | Refills: 3 | Status: SHIPPED | OUTPATIENT
Start: 2020-03-16 | End: 2021-03-16

## 2020-03-16 NOTE — TELEPHONE ENCOUNTER
Gastrointestinal Medication Protocol Passed3/15 3:16 AM   Appointment in past 6 or next 1 month     Last refill of famotidine - 6/22/19 - #180 with 2 refills  Last office visit - 2/19/20   Refilled per protocol.     Last refill of Citalopram - 3/22/19  - #9

## 2020-03-20 RX ORDER — HYDROCODONE BITARTRATE AND ACETAMINOPHEN 10; 325 MG/1; MG/1
1 TABLET ORAL EVERY 4 HOURS PRN
Qty: 150 TABLET | Refills: 0 | Status: SHIPPED | OUTPATIENT
Start: 2020-03-20 | End: 2020-04-15

## 2020-03-26 ENCOUNTER — TELEPHONE (OUTPATIENT)
Dept: FAMILY MEDICINE CLINIC | Facility: CLINIC | Age: 55
End: 2020-03-26

## 2020-03-26 NOTE — TELEPHONE ENCOUNTER
Dry sore throat, chest congestion dry cough no mucus, feels like she inhaled a chemical dry burning feeling. Chills, entire body hurts. Doesn't usually run a temp because of her neurological disorder.

## 2020-03-26 NOTE — TELEPHONE ENCOUNTER
Get Myers woke up in the middle of the night with a foreign body sensation and burning I the back of  Her throat, Tuesday night.  Since then she has had burning when she takes in a breath , not much cough, no fever she I aware, has a HX Of MG and is wheelchair

## 2020-03-26 NOTE — TELEPHONE ENCOUNTER
Pt states she started wit SXS on Tuesday. Pt states she was feeling \"not good\" that day. Tuesday night she woke up in he sleep and she was gasping for breath and something was stuck in her throat.     Today she is feeling burning when she takes a deep b

## 2020-03-28 ENCOUNTER — TELEPHONE (OUTPATIENT)
Dept: FAMILY MEDICINE CLINIC | Facility: CLINIC | Age: 55
End: 2020-03-28

## 2020-03-28 NOTE — TELEPHONE ENCOUNTER
Patient notified and verbalized understanding. Patient states she is not able to cough anything up. Says sometimes she has a little something come up but ends up swallowing it because it isn't enough to get up. Will call Monday with update.    Aware to

## 2020-03-28 NOTE — TELEPHONE ENCOUNTER
Pt called stating that she is still felling crummy. She is still having body aches. Feels it more in her chest, when she is trying to cough cant get a good cough and feels more pressure when trying to cough @ that time.  Other than that she is hurting like

## 2020-03-28 NOTE — TELEPHONE ENCOUNTER
[id number] Bonny moran maybe lets have her take some mucinex plain bid and see if that doesn;t  but If her Sx persist or worsen she is going to have to go to the ER, with her medical issues

## 2020-03-30 NOTE — TELEPHONE ENCOUNTER
Pt would like to let DS know that she is starting to feel a little better and feels like she has turned a corner for the better.     Please return call to [phone] with any questions

## 2020-04-01 RX ORDER — PHENYTOIN SODIUM 100 MG/1
CAPSULE, EXTENDED RELEASE ORAL
Qty: 60 CAPSULE | Refills: 0 | Status: SHIPPED | OUTPATIENT
Start: 2020-04-01 | End: 2020-04-30

## 2020-04-01 NOTE — TELEPHONE ENCOUNTER
Medication: dilantin    Date of last refill: 1/27/20  [place]    Last office visit: 09/26/2018  Due back to clinic per last office note:  RTN in 1 year by 09/26/2019  Date next office visit scheduled:   Future Appoint

## 2020-04-15 RX ORDER — HYDROCODONE BITARTRATE AND ACETAMINOPHEN 10; 325 MG/1; MG/1
1 TABLET ORAL EVERY 4 HOURS PRN
Qty: 150 TABLET | Refills: 0 | Status: SHIPPED | OUTPATIENT
Start: 2020-04-15 | End: 2020-05-11

## 2020-04-15 NOTE — TELEPHONE ENCOUNTER
LOV: 2/19/20  Last Refill: 3/20/20 #150 0 Rf    Future Appointments   Date Time Provider Marsha Pang   7/29/2020  3:00 PM MD SPRING [place]

## 2020-04-30 DIAGNOSIS — G71.02 MUSCULAR DYSTROPHY, FACIOSCAPULOHUMERAL (HCC): Primary | ICD-10-CM

## 2020-04-30 RX ORDER — PHENYTOIN SODIUM 100 MG/1
CAPSULE, EXTENDED RELEASE ORAL
Qty: 60 CAPSULE | Refills: 1 | Status: SHIPPED | OUTPATIENT
Start: 2020-04-30 | End: 2020-06-22

## 2020-04-30 NOTE — TELEPHONE ENCOUNTER
Medication: Phenytoin     Date of last refill: 4/1/2020 (#60/0)  [place] for [place] medication    Last office visit: 9/26/2020  Due back to clinic per last office note:  RTC in one year    Date next office visit schedul

## 2020-05-06 ENCOUNTER — PATIENT MESSAGE (OUTPATIENT)
Dept: FAMILY MEDICINE CLINIC | Facility: CLINIC | Age: 55
End: 2020-05-06

## 2020-05-06 ENCOUNTER — TELEPHONE (OUTPATIENT)
Dept: FAMILY MEDICINE CLINIC | Facility: CLINIC | Age: 55
End: 2020-05-06

## 2020-05-06 ENCOUNTER — VIRTUAL PHONE E/M (OUTPATIENT)
Dept: FAMILY MEDICINE CLINIC | Facility: CLINIC | Age: 55
End: 2020-05-06
Payer: MEDICAID

## 2020-05-06 DIAGNOSIS — G70.9 NEUROMUSCULAR RESPIRATORY WEAKNESS (HCC): Primary | ICD-10-CM

## 2020-05-06 DIAGNOSIS — J99 NEUROMUSCULAR RESPIRATORY WEAKNESS (HCC): Primary | ICD-10-CM

## 2020-05-06 DIAGNOSIS — L24.9 IRRITANT CONTACT DERMATITIS, UNSPECIFIED TRIGGER: ICD-10-CM

## 2020-05-06 PROCEDURE — 99213 OFFICE O/P EST LOW 20 MIN: CPT | Performed by: FAMILY MEDICINE

## 2020-05-06 RX ORDER — METHYLPREDNISOLONE 4 MG/1
TABLET ORAL
Qty: 1 KIT | Refills: 0 | Status: SHIPPED | OUTPATIENT
Start: 2020-05-06 | End: 2020-09-23 | Stop reason: ALTCHOICE

## 2020-05-06 NOTE — TELEPHONE ENCOUNTER
From: Анна Dorantes  To: Olga Lidia Dominguez DO  Sent: 5/6/2020 10:07 AM CDT  Subject: Other    Last of the photos

## 2020-05-06 NOTE — PROGRESS NOTES
Virtual Telephone Check-In    Mia Chávez verbally consents to a Virtual/Telephone Check-In visit on 05/06/20. Patient understands and accepts financial responsibility for any deductible, co-insurance and/or co-pays associated with this service. taking: Reported on 2/19/2020 ) 1 kit 0   • docusate sodium (COLACE) 100 MG Oral Cap Take 100 mg by mouth 2 (two) times daily as needed. • Cholecalciferol (VITAMIN D) 400 UNITS Oral Cap Take  by mouth.      • ibuprofen (MOTRIN) 600 MG Oral Tab Take 60 asked to return in [address] [place] [address] VISIT VIDEO OR PHONE.  CAN USE TOPICAL CERAVAE OR AQUAPHOR FOR A MOISTURIZER  Discussed medication side effects and expected course  To call back if any side effects or if no significant change in 7-10 days

## 2020-05-06 NOTE — TELEPHONE ENCOUNTER
From: Luzma Mccormick  To: Vannesa Tomlin DO  Sent: 5/6/2020 10:02 AM CDT  Subject: Other    Photos of some of the rash.

## 2020-05-11 RX ORDER — HYDROCODONE BITARTRATE AND ACETAMINOPHEN 10; 325 MG/1; MG/1
1 TABLET ORAL EVERY 4 HOURS PRN
Qty: 150 TABLET | Refills: 0 | Status: SHIPPED | OUTPATIENT
Start: 2020-05-11 | End: 2020-06-05

## 2020-05-11 NOTE — TELEPHONE ENCOUNTER
PT CALLED AND ADV NEEDS REFILL OF     HYDROcodone-acetaminophen  MG Oral Tab    PLEASE SEND TO [facility] [address]    THANK YOU

## 2020-05-11 NOTE — TELEPHONE ENCOUNTER
LOV: 2/19/20  Last Refill: 4/15/20 #150 0 Rf    Future Appointments   Date Time Provider Marsha Poly   5/18/2020  9:20 AM Skip Stuart DO [facility] [place]   7/29/2020  3:00 PM Mayo Houston MD [facility] [place]

## 2020-05-18 ENCOUNTER — VIRTUAL PHONE E/M (OUTPATIENT)
Dept: FAMILY MEDICINE CLINIC | Facility: CLINIC | Age: 55
End: 2020-05-18
Payer: MEDICAID

## 2020-05-18 DIAGNOSIS — L25.9 CONTACT DERMATITIS, UNSPECIFIED CONTACT DERMATITIS TYPE, UNSPECIFIED TRIGGER: Primary | ICD-10-CM

## 2020-05-18 DIAGNOSIS — L50.9 URTICARIA: ICD-10-CM

## 2020-05-18 PROCEDURE — 99213 OFFICE O/P EST LOW 20 MIN: CPT | Performed by: FAMILY MEDICINE

## 2020-05-18 RX ORDER — PREDNISONE 10 MG/1
TABLET ORAL
Qty: 18 TABLET | Refills: 0 | Status: SHIPPED | OUTPATIENT
Start: 2020-05-18 | End: 2020-09-23 | Stop reason: ALTCHOICE

## 2020-05-18 NOTE — PROGRESS NOTES
Virtual Telephone Check-In    Mango Ap verbally consents to a Virtual/Telephone Check-In visit on 05/18/20. Patient understands and accepts financial responsibility for any deductible, co-insurance and/or co-pays associated with this service.   D minutes after first dose. (Patient not taking: Reported on 2/19/2020 ) 1 kit 0   • docusate sodium (COLACE) 100 MG Oral Cap Take 100 mg by mouth 2 (two) times daily as needed. • Cholecalciferol (VITAMIN D) 400 UNITS Oral Cap Take  by mouth.      • ibu

## 2020-06-05 RX ORDER — HYDROCODONE BITARTRATE AND ACETAMINOPHEN 10; 325 MG/1; MG/1
1 TABLET ORAL EVERY 4 HOURS PRN
Qty: 150 TABLET | Refills: 0 | Status: SHIPPED | OUTPATIENT
Start: 2020-06-05 | End: 2020-07-01

## 2020-06-08 ENCOUNTER — VIRTUAL PHONE E/M (OUTPATIENT)
Dept: FAMILY MEDICINE CLINIC | Facility: CLINIC | Age: 55
End: 2020-06-08
Payer: MEDICAID

## 2020-06-08 DIAGNOSIS — L50.9 URTICARIA: ICD-10-CM

## 2020-06-08 DIAGNOSIS — L24.9 IRRITANT CONTACT DERMATITIS, UNSPECIFIED TRIGGER: Primary | ICD-10-CM

## 2020-06-08 PROCEDURE — 99213 OFFICE O/P EST LOW 20 MIN: CPT | Performed by: FAMILY MEDICINE

## 2020-06-08 RX ORDER — PREDNISONE 10 MG/1
TABLET ORAL
Qty: 18 TABLET | Refills: 0 | Status: SHIPPED | OUTPATIENT
Start: 2020-06-08 | End: 2020-09-23 | Stop reason: ALTCHOICE

## 2020-06-08 NOTE — PROGRESS NOTES
Virtual Telephone Check-In    Sergio Gardner verbally consents to a Virtual/Telephone Check-In visit on 06/08/20. Patient has been referred to the [facility] website at [email or web address] to review the yearly Consent to Treat document.     Patient unders SODIUM 100 MCG Oral Tab TAKE 1 TABLET(100 MCG) BY MOUTH BEFORE BREAKFAST 30 tablet 5   • DiphenhydrAMINE HCl 25 MG Oral Cap Take 50 mg by mouth. • Naloxone HCl 4 MG/0.1ML Nasal Liquid 4 mg by Nasal route as needed.  If patient remains unresponsive, repe these issues and agrees to the plan. The patient is asked to return in 10-14 days if her Sx persist , then I will need to see her in the office.   Spent 9 minutes and 23 sec on the phone with patient

## 2020-06-20 DIAGNOSIS — G71.02 MUSCULAR DYSTROPHY, FACIOSCAPULOHUMERAL (HCC): ICD-10-CM

## 2020-06-21 RX ORDER — LEVOTHYROXINE SODIUM 0.1 MG/1
TABLET ORAL
Qty: 90 TABLET | Refills: 2 | Status: SHIPPED | OUTPATIENT
Start: 2020-06-21 | End: 2021-04-23

## 2020-06-21 RX ORDER — FAMOTIDINE 20 MG/1
TABLET, FILM COATED ORAL
Qty: 180 TABLET | Refills: 3 | Status: SHIPPED | OUTPATIENT
Start: 2020-06-21 | End: 2021-06-15

## 2020-06-22 RX ORDER — PHENYTOIN SODIUM 100 MG/1
CAPSULE, EXTENDED RELEASE ORAL
Qty: 60 CAPSULE | Refills: 1 | Status: SHIPPED | OUTPATIENT
Start: 2020-06-22 | End: 2020-08-20

## 2020-06-22 NOTE — TELEPHONE ENCOUNTER
Medication: dilantin    Date of last refill: 4/30/20  [place]    Last office visit: 9/20/19  Due back to clinic per last office note:  1 year  Date next office visit scheduled:    Future Appointments   Date Time Provi

## 2020-07-01 RX ORDER — HYDROCODONE BITARTRATE AND ACETAMINOPHEN 10; 325 MG/1; MG/1
1 TABLET ORAL EVERY 4 HOURS PRN
Qty: 150 TABLET | Refills: 0 | Status: SHIPPED | OUTPATIENT
Start: 2020-07-01 | End: 2020-07-25

## 2020-07-14 ENCOUNTER — TELEPHONE (OUTPATIENT)
Dept: FAMILY MEDICINE CLINIC | Facility: CLINIC | Age: 55
End: 2020-07-14

## 2020-07-14 NOTE — TELEPHONE ENCOUNTER
[facility] Face to Face Examination Report paperwork received. Filled out by Dr. Roosevelt Palmer. Will send with insurance and identification copies. Need to call patient and confirm legal last night, SSN, and address.     Call

## 2020-07-24 NOTE — TELEPHONE ENCOUNTER
HYDROcodone-acetaminophen  MG Oral Tab    [facility] [address] [facility]

## 2020-07-25 RX ORDER — HYDROCODONE BITARTRATE AND ACETAMINOPHEN 10; 325 MG/1; MG/1
1 TABLET ORAL EVERY 4 HOURS PRN
Qty: 150 TABLET | Refills: 0 | Status: SHIPPED | OUTPATIENT
Start: 2020-07-25 | End: 2020-08-20

## 2020-08-20 DIAGNOSIS — G71.02 MUSCULAR DYSTROPHY, FACIOSCAPULOHUMERAL (HCC): ICD-10-CM

## 2020-08-20 RX ORDER — PHENYTOIN SODIUM 100 MG/1
CAPSULE, EXTENDED RELEASE ORAL
Qty: 60 CAPSULE | Refills: 0 | Status: SHIPPED | OUTPATIENT
Start: 2020-08-20 | End: 2020-10-02

## 2020-08-20 RX ORDER — HYDROCODONE BITARTRATE AND ACETAMINOPHEN 10; 325 MG/1; MG/1
1 TABLET ORAL EVERY 4 HOURS PRN
Qty: 150 TABLET | Refills: 0 | Status: SHIPPED | OUTPATIENT
Start: 2020-08-20 | End: 2020-09-14

## 2020-08-20 NOTE — TELEPHONE ENCOUNTER
LOV: 6/8/20   Last Refill: 7/25/20 #150 0 RF    Future Appointments   Date Time Provider Marsha Poly   8/26/2020 11:20 AM MD SPRING [place] 03-Nov-2018 15:00

## 2020-08-20 NOTE — TELEPHONE ENCOUNTER
PT CALLED AND ADV NEEDS REFILL OF       HYDROcodone-acetaminophen  MG Oral Tab      PLEASE SEND TO [facility]ina V[facility] [address]    THANK YOU

## 2020-08-20 NOTE — TELEPHONE ENCOUNTER
Medication: dilantin 100 mg     Date of last refill: 06/22/20  [place]     Last office visit: 9/20/19  Due back to clinic per last office note:  1 year  Date next office visit scheduled:  08/26/20       Last OV not

## 2020-09-14 RX ORDER — HYDROCODONE BITARTRATE AND ACETAMINOPHEN 10; 325 MG/1; MG/1
1 TABLET ORAL EVERY 4 HOURS PRN
Qty: 150 TABLET | Refills: 0 | Status: SHIPPED | OUTPATIENT
Start: 2020-09-14 | End: 2020-10-08

## 2020-09-14 NOTE — TELEPHONE ENCOUNTER
LOV: 5/18/20 Telemed  Last Refill: 8/20/20 #150 0 RF    Future Appointments   Date Time Provider Marsha Pang   9/23/2020  3:00 PM MD SPRING [facility] Cherry Matias

## 2020-09-23 ENCOUNTER — OFFICE VISIT (OUTPATIENT)
Dept: NEUROLOGY | Facility: CLINIC | Age: 55
End: 2020-09-23
Payer: MEDICAID

## 2020-09-23 VITALS
WEIGHT: 200 LBS | RESPIRATION RATE: 15 BRPM | SYSTOLIC BLOOD PRESSURE: 116 MMHG | HEART RATE: 66 BPM | DIASTOLIC BLOOD PRESSURE: 66 MMHG | BODY MASS INDEX: 33.32 KG/M2 | HEIGHT: 65 IN

## 2020-09-23 DIAGNOSIS — G71.02 MUSCULAR DYSTROPHY, FACIOSCAPULOHUMERAL (HCC): Primary | ICD-10-CM

## 2020-09-23 PROCEDURE — 3008F BODY MASS INDEX DOCD: CPT | Performed by: OTHER

## 2020-09-23 PROCEDURE — 99213 OFFICE O/P EST LOW 20 MIN: CPT | Performed by: OTHER

## 2020-09-23 PROCEDURE — 3074F SYST BP LT 130 MM HG: CPT | Performed by: OTHER

## 2020-09-23 PROCEDURE — 3078F DIAST BP <80 MM HG: CPT | Performed by: OTHER

## 2020-09-23 NOTE — PROGRESS NOTES
[facility] with [address]  9/17/1965  Primary Care Provider:  Eden Colbert DO    9/23/2020  Accompanied visit:      (x) No.        50year old yo patient being seen for:  Agustín LEVOTHYROXINE SODIUM 100 MCG Oral Tab, TAKE 1 TABLET(100 MCG) BY MOUTH BEFORE BREAKFAST, Disp: 90 tablet, Rfl: 2  •  CITALOPRAM HYDROBROMIDE 20 MG Oral Tab, TAKE 1 TABLET BY MOUTH EVERY DAY, Disp: 90 tablet, Rfl: 3  •  BACLOFEN 10 MG Oral Tab, TAKE 1 TABLE F2F  (  ) Telephone time with patiern or authorized [facility] member--non F2F  ( x ) other records reviewed --non F2F including consultations  (  ) [facility] meetings - patient not present --non F2F  Non Face to Face CPT code 93297/51892 applies as documented above

## 2020-09-23 NOTE — PATIENT INSTRUCTIONS
After your visit at the [place] office  today,  please direct any follow up questions or medication needs to the staff in our  [place] office so that your concerns may be promptly addressed.   We are available through [facility] or at the numbers below: picked up in office. • Please allow the office 2-3 business days to fill the prescription. • Patient must present photo ID at time of . PLEASE NOTE: PRESCRIPTIONS MUST BE PICKED UP PRIOR TO 3:00PM MONDAY-FRIDAY    Scheduling Tests:     If your ph submitting forms to office staff. • Form completion may require an additional fee. • A signed Release of Information (RAMYA) must be on file before forms may be submitted. When dropping off forms, please ask the  for this paper.    • Failure

## 2020-09-28 ENCOUNTER — TELEPHONE (OUTPATIENT)
Dept: FAMILY MEDICINE CLINIC | Facility: CLINIC | Age: 55
End: 2020-09-28

## 2020-09-28 NOTE — TELEPHONE ENCOUNTER
Pt states she has noticed full body swelling since Thursday. Pt states it is non- pitting edema.     Pt denies increase in salt and/or fluid recently    Pt states she has been feeling more tired and lack of energy- she is wondering if this could be related

## 2020-09-28 NOTE — TELEPHONE ENCOUNTER
PT CALLED AND ADV THAT SHE HAS A LOT OF SWELLING GOING ON ALL OVER BODY. PT THINKS THAT THIS MAY BE DUE TO HER THYROID. LACK OF ENERGY.     LOOKING FOR RECOMMENDATIONS      PLEASE ADV    THANK YOU

## 2020-10-02 DIAGNOSIS — G71.02 MUSCULAR DYSTROPHY, FACIOSCAPULOHUMERAL (HCC): ICD-10-CM

## 2020-10-02 RX ORDER — PHENYTOIN SODIUM 100 MG/1
100 CAPSULE, EXTENDED RELEASE ORAL 2 TIMES DAILY
Qty: 180 CAPSULE | Refills: 1 | Status: SHIPPED | OUTPATIENT
Start: 2020-10-02 | End: 2021-02-24

## 2020-10-02 NOTE — TELEPHONE ENCOUNTER
Medication: Phenytoin 100 mg    Date of last refill: 08/20/20  [place]    Last office visit: 9/23/2020  Due back to clinic per last office note:  RTN in 1 year  Date next office visit scheduled:        Last OV note recom

## 2020-10-07 ENCOUNTER — LABORATORY ENCOUNTER (OUTPATIENT)
Dept: LAB | Age: 55
End: 2020-10-07
Attending: FAMILY MEDICINE
Payer: MEDICAID

## 2020-10-07 ENCOUNTER — OFFICE VISIT (OUTPATIENT)
Dept: FAMILY MEDICINE CLINIC | Facility: CLINIC | Age: 55
End: 2020-10-07
Payer: MEDICAID

## 2020-10-07 VITALS
WEIGHT: 225.63 LBS | DIASTOLIC BLOOD PRESSURE: 90 MMHG | HEART RATE: 64 BPM | RESPIRATION RATE: 12 BRPM | BODY MASS INDEX: 38 KG/M2 | TEMPERATURE: 96 F | SYSTOLIC BLOOD PRESSURE: 134 MMHG

## 2020-10-07 DIAGNOSIS — R63.5 WEIGHT GAIN: ICD-10-CM

## 2020-10-07 DIAGNOSIS — R60.0 EDEMA, LOWER EXTREMITY: ICD-10-CM

## 2020-10-07 DIAGNOSIS — E03.9 ACQUIRED HYPOTHYROIDISM: ICD-10-CM

## 2020-10-07 DIAGNOSIS — Z12.31 ENCOUNTER FOR SCREENING MAMMOGRAM FOR BREAST CANCER: ICD-10-CM

## 2020-10-07 DIAGNOSIS — G72.49 INFLAMMATORY MYOPATHY: ICD-10-CM

## 2020-10-07 DIAGNOSIS — E03.9 ACQUIRED HYPOTHYROIDISM: Primary | ICD-10-CM

## 2020-10-07 PROCEDURE — 36415 COLL VENOUS BLD VENIPUNCTURE: CPT

## 2020-10-07 PROCEDURE — 3075F SYST BP GE 130 - 139MM HG: CPT | Performed by: FAMILY MEDICINE

## 2020-10-07 PROCEDURE — 84481 FREE ASSAY (FT-3): CPT

## 2020-10-07 PROCEDURE — 80061 LIPID PANEL: CPT

## 2020-10-07 PROCEDURE — 99214 OFFICE O/P EST MOD 30 MIN: CPT | Performed by: FAMILY MEDICINE

## 2020-10-07 PROCEDURE — 80053 COMPREHEN METABOLIC PANEL: CPT

## 2020-10-07 PROCEDURE — 84443 ASSAY THYROID STIM HORMONE: CPT

## 2020-10-07 PROCEDURE — 84439 ASSAY OF FREE THYROXINE: CPT

## 2020-10-07 PROCEDURE — 3080F DIAST BP >= 90 MM HG: CPT | Performed by: FAMILY MEDICINE

## 2020-10-07 NOTE — PROGRESS NOTES
Stepan Ballesteros is a 50year old female.   HPI:   Timothy Bautista has noted she has gained weight , feeling more fatigued rings don't fit well anymore, takes her thyroid on an empty stomach, no other new meds, saw neurology 2 weeks ago, changed baclofen to 3 tabs da Smoking status: Former Smoker        Packs/day: 0.50        Years: 15.00        Pack years: 7.5        Quit date: 2013        Years since quittin.1      Smokeless tobacco: Former User    Alcohol use: No      Alcohol/week: 0.0 standard drinks    Dr

## 2020-10-08 ENCOUNTER — TELEPHONE (OUTPATIENT)
Dept: FAMILY MEDICINE CLINIC | Facility: CLINIC | Age: 55
End: 2020-10-08

## 2020-10-08 DIAGNOSIS — E03.9 ACQUIRED HYPOTHYROIDISM: Primary | ICD-10-CM

## 2020-10-08 RX ORDER — HYDROCODONE BITARTRATE AND ACETAMINOPHEN 10; 325 MG/1; MG/1
1 TABLET ORAL EVERY 4 HOURS PRN
Qty: 150 TABLET | Refills: 0 | Status: SHIPPED | OUTPATIENT
Start: 2020-10-08 | End: 2020-11-02

## 2020-10-08 NOTE — TELEPHONE ENCOUNTER
Pt was advised of results verbalized understanding  Pt is currently still taking 100 mcg  On an empty stomach daily    Rafa call back to schedule with lab

## 2020-10-08 NOTE — TELEPHONE ENCOUNTER
----- Message from Michelle Lyn DO sent at 10/8/2020  2:12 PM CDT -----  Can notify Cassidy Horn, that her cholesterol could be and needs to be better, 225 is too high. her thyroid is a little sluggish but not enough to warrant any medication at this point.  Leigha Reed

## 2020-10-08 NOTE — TELEPHONE ENCOUNTER
HYDROcodone-acetaminophen  MG Oral Tab      PT WOULD LIKE REFILL SENT TO   [facility][id number] Kathy Neil, [address],  AT [facility] OF [address], [phone], [phone]

## 2020-10-08 NOTE — TELEPHONE ENCOUNTER
LOV: yesterday   Last Refill: 9/14/20 #150 0 RF    Future Appointments   Date Time Provider Marsha Pang   11/24/2020 10:00 AM PF DULCE RM1 PF ROGER Schwartz

## 2020-10-15 ENCOUNTER — TELEPHONE (OUTPATIENT)
Dept: PHYSICAL THERAPY | Age: 55
End: 2020-10-15

## 2020-10-19 ENCOUNTER — TELEPHONE (OUTPATIENT)
Dept: FAMILY MEDICINE CLINIC | Facility: CLINIC | Age: 55
End: 2020-10-19

## 2020-10-19 NOTE — TELEPHONE ENCOUNTER
Jeanne Alfonso with [employer] needs PT order for a wheelchair. She is faxing a request to nurse.      Thank you

## 2020-11-02 RX ORDER — HYDROCODONE BITARTRATE AND ACETAMINOPHEN 10; 325 MG/1; MG/1
1 TABLET ORAL EVERY 4 HOURS PRN
Qty: 150 TABLET | Refills: 0 | Status: SHIPPED | OUTPATIENT
Start: 2020-11-02 | End: 2020-11-27

## 2020-11-02 NOTE — TELEPHONE ENCOUNTER
No refill protocol for this medication. Last refill: 10/08/2020 #150 with 0 refills  Last Visit: 10/07/2020  Next Visit:   No Future Appointments         Forward to Dr. Mandie Perkins please advise on refills. Pt only has medication for today. Thanks.

## 2020-11-03 NOTE — TELEPHONE ENCOUNTER
Last refilled 2/11/20 #90 with 2 RF  LOV with DS 10/7/20  No future appt with DS  Medication passed protocol due to the following reasons:  Last CMP 10/7/20    Hypertension Medications Protocol Hrludm3011/03/2020 09:51 AM   CMP or BMP in past 12 months Jaleesa

## 2020-11-27 RX ORDER — HYDROCODONE BITARTRATE AND ACETAMINOPHEN 10; 325 MG/1; MG/1
1 TABLET ORAL EVERY 4 HOURS PRN
Qty: 150 TABLET | Refills: 0 | Status: SHIPPED | OUTPATIENT
Start: 2020-11-27 | End: 2020-12-21

## 2020-11-27 NOTE — TELEPHONE ENCOUNTER
Pt called, needs refill on HYDROcodone-acetaminophen  MG Oral Tab. Pharmacy-[facility] [address] [place].   Please call pt at [phone]

## 2020-12-21 RX ORDER — HYDROCODONE BITARTRATE AND ACETAMINOPHEN 10; 325 MG/1; MG/1
1 TABLET ORAL EVERY 4 HOURS PRN
Qty: 150 TABLET | Refills: 0 | Status: SHIPPED | OUTPATIENT
Start: 2020-12-21 | End: 2021-01-13

## 2020-12-21 NOTE — TELEPHONE ENCOUNTER
LOV: 10/7/20   Last Refill: 11/27/20 #1500 RF    Future Appointments   Date Time Provider Marsha Pang   12/31/2020 10:00 AM PF DULCE RM2 CRISELDA Schwartz

## 2020-12-22 ENCOUNTER — TELEPHONE (OUTPATIENT)
Dept: FAMILY MEDICINE CLINIC | Facility: CLINIC | Age: 55
End: 2020-12-22

## 2020-12-28 ENCOUNTER — TELEPHONE (OUTPATIENT)
Dept: FAMILY MEDICINE CLINIC | Facility: CLINIC | Age: 55
End: 2020-12-28

## 2020-12-28 NOTE — TELEPHONE ENCOUNTER
Approval received today for [employer]   REquest ID [id number]  For Robert  Begin 12/17/20  End Date 6/17/2021  Wheelchair accessories and eval

## 2021-01-04 ENCOUNTER — TELEPHONE (OUTPATIENT)
Dept: FAMILY MEDICINE CLINIC | Facility: CLINIC | Age: 56
End: 2021-01-04

## 2021-01-04 RX ORDER — BACLOFEN 10 MG/1
TABLET ORAL
Qty: 225 TABLET | Refills: 3 | Status: SHIPPED | OUTPATIENT
Start: 2021-01-04 | End: 2021-01-29

## 2021-01-04 NOTE — TELEPHONE ENCOUNTER
Routing to Dr. Katarzyna Ritchie. Baclofen 10 mg ordered today for 2.5 tabs/day. See OV note 10/7/20, patient reported changing Baclofen to 3 tabs daily.

## 2021-01-04 NOTE — TELEPHONE ENCOUNTER
Pt would like to talk to nurse about her   BACLOFEN 10 MG Oral Tab    The insurance is saying its to soon. But she said it was increased to 3 a day, and the pharmacy still has the script for 2.5 a day.      DS needs to update script so they can override i

## 2021-01-13 RX ORDER — HYDROCODONE BITARTRATE AND ACETAMINOPHEN 10; 325 MG/1; MG/1
1 TABLET ORAL EVERY 4 HOURS PRN
Qty: 150 TABLET | Refills: 0 | Status: SHIPPED | OUTPATIENT
Start: 2021-01-13 | End: 2021-02-05

## 2021-01-13 NOTE — TELEPHONE ENCOUNTER
Pt called, needs refill on HYDROcodone-acetaminophen  MG Oral Tab. Pharmacy-[facility] [address] [place].   Please call pt at [phone]

## 2021-01-29 ENCOUNTER — TELEPHONE (OUTPATIENT)
Dept: FAMILY MEDICINE CLINIC | Facility: CLINIC | Age: 56
End: 2021-01-29

## 2021-01-29 RX ORDER — BACLOFEN 10 MG/1
10 TABLET ORAL 3 TIMES DAILY
Qty: 360 TABLET | Refills: 1 | Status: SHIPPED | OUTPATIENT
Start: 2021-01-29 | End: 2021-04-29

## 2021-01-29 NOTE — TELEPHONE ENCOUNTER
Pt called and spoke with Pharmacy. She was told they will not fill the   BACLOFEN 10 MG Oral Tab  They said it was to soon. She said she is taking 3 a day and the instructions on the last scrip was only for 2.5 tabs per day so she is out early.      She

## 2021-02-05 RX ORDER — HYDROCODONE BITARTRATE AND ACETAMINOPHEN 10; 325 MG/1; MG/1
1 TABLET ORAL EVERY 4 HOURS PRN
Qty: 150 TABLET | Refills: 0 | Status: SHIPPED | OUTPATIENT
Start: 2021-02-05 | End: 2021-03-02

## 2021-02-23 DIAGNOSIS — G71.02 MUSCULAR DYSTROPHY, FACIOSCAPULOHUMERAL (HCC): ICD-10-CM

## 2021-02-24 RX ORDER — PHENYTOIN SODIUM 100 MG/1
CAPSULE, EXTENDED RELEASE ORAL
Qty: 180 CAPSULE | Refills: 1 | Status: SHIPPED | OUTPATIENT
Start: 2021-02-24 | End: 2021-09-23

## 2021-02-24 NOTE — TELEPHONE ENCOUNTER
Medication: Phenytoin 100 mg     Date of last refill: 10/20/20 with 1 addt refill  [place]     Last office visit: 9/23/2020  Due back to clinic per last office note:  RTN in 1 year  Date next office visit scheduled:

## 2021-03-02 RX ORDER — HYDROCODONE BITARTRATE AND ACETAMINOPHEN 10; 325 MG/1; MG/1
1 TABLET ORAL EVERY 4 HOURS PRN
Qty: 150 TABLET | Refills: 0 | Status: SHIPPED | OUTPATIENT
Start: 2021-03-02 | End: 2021-03-27

## 2021-03-04 ENCOUNTER — TELEPHONE (OUTPATIENT)
Dept: FAMILY MEDICINE CLINIC | Facility: CLINIC | Age: 56
End: 2021-03-04

## 2021-03-16 RX ORDER — CITALOPRAM 20 MG/1
TABLET ORAL
Qty: 90 TABLET | Refills: 3 | Status: SHIPPED | OUTPATIENT
Start: 2021-03-16 | End: 2021-06-14

## 2021-03-26 NOTE — TELEPHONE ENCOUNTER
No refill protocol for this medication. Last refill: 3- #150 with 0 refills  Last Visit: 10-  Next Visit: No future appointments. Forward to Dr. Christopher Smith please advise on refills. Thanks.

## 2021-03-27 RX ORDER — HYDROCODONE BITARTRATE AND ACETAMINOPHEN 10; 325 MG/1; MG/1
1 TABLET ORAL EVERY 4 HOURS PRN
Qty: 150 TABLET | Refills: 0 | Status: SHIPPED | OUTPATIENT
Start: 2021-03-27 | End: 2021-04-19

## 2021-04-06 ENCOUNTER — TELEPHONE (OUTPATIENT)
Dept: FAMILY MEDICINE CLINIC | Facility: CLINIC | Age: 56
End: 2021-04-06

## 2021-04-19 RX ORDER — HYDROCODONE BITARTRATE AND ACETAMINOPHEN 10; 325 MG/1; MG/1
1 TABLET ORAL EVERY 4 HOURS PRN
Qty: 150 TABLET | Refills: 0 | Status: SHIPPED | OUTPATIENT
Start: 2021-04-19 | End: 2021-05-11

## 2021-04-23 RX ORDER — LEVOTHYROXINE SODIUM 0.1 MG/1
TABLET ORAL
Qty: 90 TABLET | Refills: 2 | Status: SHIPPED | OUTPATIENT
Start: 2021-04-23 | End: 2022-02-07

## 2021-04-23 NOTE — TELEPHONE ENCOUNTER
Thyroid Supplements Protocol [id number]/23/2021 11:19 AM   TSH test in past 12 months Protocol Details    TSH value between 0.350 and 5.500 IU/ml     Appointment in past 12 or next 3 months      Last refilled on 06/21/2020 for # 90 with 2 rf.    Last labs 10/

## 2021-05-11 ENCOUNTER — TELEPHONE (OUTPATIENT)
Dept: FAMILY MEDICINE CLINIC | Facility: CLINIC | Age: 56
End: 2021-05-11

## 2021-05-11 RX ORDER — HYDROCODONE BITARTRATE AND ACETAMINOPHEN 10; 325 MG/1; MG/1
1 TABLET ORAL EVERY 4 HOURS PRN
Qty: 150 TABLET | Refills: 0 | Status: SHIPPED | OUTPATIENT
Start: 2021-05-11 | End: 2021-06-03

## 2021-05-11 NOTE — TELEPHONE ENCOUNTER
HYDROcodone-acetaminophen  MG Oral Tab        Pt would like refill sent to   [facility] [address] [facility], [address] [place] [address], [phone], [phone]

## 2021-05-12 NOTE — TELEPHONE ENCOUNTER
Pt called because pharmacy will not fill the medication that was sent yesterday until 5/17/21. They said Dr Ammon Cerrato needs to send over a new prescription.

## 2021-05-12 NOTE — TELEPHONE ENCOUNTER
Spoke with pharmacy- she picked up script on 4/19    Pt picked up a 30 day supply and the pharmacy told her she can't  current script until 5/17. Pt told pharmacy that DS told her she can take more than 4 /day.     Pharmacist states it pt is going t

## 2021-05-17 NOTE — TELEPHONE ENCOUNTER
Pt was advised- verbalized understanding    Future Appointments   Date Time Provider Marsha Pang   5/27/2021  4:20 PM Barbara Cardenas [facility] MARIA ELENA Coats

## 2021-05-17 NOTE — TELEPHONE ENCOUNTER
So its been a while since I've seen her and maybe an appt is in order to talk about her pain and how we can help her. I'm concerned that she needs to take more.

## 2021-05-27 ENCOUNTER — OFFICE VISIT (OUTPATIENT)
Dept: FAMILY MEDICINE CLINIC | Facility: CLINIC | Age: 56
End: 2021-05-27
Payer: MEDICAID

## 2021-05-27 VITALS
HEART RATE: 56 BPM | SYSTOLIC BLOOD PRESSURE: 130 MMHG | RESPIRATION RATE: 16 BRPM | BODY MASS INDEX: 38 KG/M2 | TEMPERATURE: 97 F | WEIGHT: 225.38 LBS | DIASTOLIC BLOOD PRESSURE: 86 MMHG

## 2021-05-27 DIAGNOSIS — G72.49 INFLAMMATORY MYOPATHY: ICD-10-CM

## 2021-05-27 DIAGNOSIS — G70.9 NEUROMUSCULAR RESPIRATORY WEAKNESS (HCC): Primary | ICD-10-CM

## 2021-05-27 DIAGNOSIS — G71.02 MUSCULAR DYSTROPHY, FACIOSCAPULOHUMERAL (HCC): ICD-10-CM

## 2021-05-27 DIAGNOSIS — J99 NEUROMUSCULAR RESPIRATORY WEAKNESS (HCC): Primary | ICD-10-CM

## 2021-05-27 PROCEDURE — 3079F DIAST BP 80-89 MM HG: CPT | Performed by: FAMILY MEDICINE

## 2021-05-27 PROCEDURE — 3075F SYST BP GE 130 - 139MM HG: CPT | Performed by: FAMILY MEDICINE

## 2021-05-27 PROCEDURE — 99214 OFFICE O/P EST MOD 30 MIN: CPT | Performed by: FAMILY MEDICINE

## 2021-05-27 RX ORDER — NALOXONE HYDROCHLORIDE 4 MG/.1ML
4 SPRAY, METERED NASAL AS NEEDED
Qty: 1 KIT | Refills: 3 | Status: SHIPPED | OUTPATIENT
Start: 2021-05-27

## 2021-05-27 RX ORDER — ONDANSETRON 4 MG/1
TABLET, ORALLY DISINTEGRATING ORAL
Qty: 30 TABLET | Refills: 3 | Status: SHIPPED | OUTPATIENT
Start: 2021-05-27

## 2021-05-27 RX ORDER — GABAPENTIN 100 MG/1
100 CAPSULE ORAL 2 TIMES DAILY
Qty: 60 CAPSULE | Refills: 2 | Status: SHIPPED | OUTPATIENT
Start: 2021-05-27 | End: 2021-08-23

## 2021-05-27 NOTE — PROGRESS NOTES
Jing Washington is a 50year old female. HPI:   Wyatt Ghotra is here for discussion of her pain and her pain meds. , she has been taking a considerable amount of Norco and I was concerened about the tylenol dose as well as the MDD, not so much form an abuse lisette mouth.     • ibuprofen (MOTRIN) 600 MG Oral Tab Take 600 mg by mouth every 6 (six) hours as needed.  Indications: PAIN        Past Medical History:   Diagnosis Date   • [facility]       Social History:  Social History    Tobacco Use repeat dose in other nostril 2-5 minutes after first dose. • gabapentin 100 MG Oral Cap 60 capsule 2     Sig: Take 1 capsule (100 mg total) by mouth 2 (two) times daily.        Imaging & Consults:  None     The patient indicates understanding of these iss

## 2021-06-03 RX ORDER — HYDROCODONE BITARTRATE AND ACETAMINOPHEN 10; 325 MG/1; MG/1
1 TABLET ORAL EVERY 4 HOURS PRN
Qty: 150 TABLET | Refills: 0 | Status: SHIPPED | OUTPATIENT
Start: 2021-06-03 | End: 2021-06-28

## 2021-06-03 NOTE — TELEPHONE ENCOUNTER
Pt called for two reasons. 1. Give an update for gabapentin 100 MG Oral Cap. Pt does not see a difference after taking this. 2. Pt requested a refill of the following medication.     HYDROcodone-acetaminophen  MG Oral Tab    [facility]

## 2021-06-03 NOTE — TELEPHONE ENCOUNTER
Please see note below    Current Norco SIG is for 4 tabs daily PRN- did you talk with pt last week about taking it differently?

## 2021-06-15 RX ORDER — FAMOTIDINE 20 MG/1
TABLET ORAL
Qty: 180 TABLET | Refills: 3 | Status: SHIPPED | OUTPATIENT
Start: 2021-06-15

## 2021-06-28 RX ORDER — HYDROCODONE BITARTRATE AND ACETAMINOPHEN 10; 325 MG/1; MG/1
1 TABLET ORAL EVERY 4 HOURS PRN
Qty: 150 TABLET | Refills: 0 | Status: SHIPPED | OUTPATIENT
Start: 2021-06-28 | End: 2021-07-22

## 2021-06-28 NOTE — TELEPHONE ENCOUNTER
Pt called to request a refill of the following medication:    HYDROcodone-acetaminophen  MG Oral Tab    [facility] [address] [facility], [address] [facility], [phone], [phone]

## 2021-07-01 ENCOUNTER — MED REC SCAN ONLY (OUTPATIENT)
Dept: FAMILY MEDICINE CLINIC | Facility: CLINIC | Age: 56
End: 2021-07-01

## 2021-07-21 NOTE — TELEPHONE ENCOUNTER
Hypertension Medications Protocol [id number]/21/2021 12:48 PM   CMP or BMP in past 12 months Protocol Details    Last serum creatinine< 2.0     Appointment in past 6 or next 3 months      Metoprolol    LOV: 5/27/21   Last Refill: 11/3/20 #90 2 RF    No futur

## 2021-07-22 RX ORDER — HYDROCODONE BITARTRATE AND ACETAMINOPHEN 10; 325 MG/1; MG/1
1 TABLET ORAL EVERY 4 HOURS PRN
Qty: 150 TABLET | Refills: 0 | Status: SHIPPED | OUTPATIENT
Start: 2021-07-22 | End: 2021-08-16

## 2021-07-27 ENCOUNTER — TELEPHONE (OUTPATIENT)
Dept: FAMILY MEDICINE CLINIC | Facility: CLINIC | Age: 56
End: 2021-07-27

## 2021-08-16 RX ORDER — HYDROCODONE BITARTRATE AND ACETAMINOPHEN 10; 325 MG/1; MG/1
1 TABLET ORAL EVERY 4 HOURS PRN
Qty: 150 TABLET | Refills: 0 | Status: SHIPPED | OUTPATIENT
Start: 2021-08-16 | End: 2021-09-11

## 2021-08-16 NOTE — TELEPHONE ENCOUNTER
HYDROcodone-acetaminophen  MG Oral Tab      Pt would like refill sent to   [facility] 52 403 [facility], [address] [facility] 70, [phone], [phone]

## 2021-08-23 RX ORDER — GABAPENTIN 100 MG/1
CAPSULE ORAL
Qty: 60 CAPSULE | Refills: 2 | Status: SHIPPED | OUTPATIENT
Start: 2021-08-23 | End: 2021-11-22

## 2021-09-10 ENCOUNTER — TELEPHONE (OUTPATIENT)
Dept: FAMILY MEDICINE CLINIC | Facility: CLINIC | Age: 56
End: 2021-09-10

## 2021-09-10 NOTE — TELEPHONE ENCOUNTER
LOV 05/27/2021    Last refill on 08/16/2021, for #150 tabs, with 0 refills  HYDROcodone-acetaminophen  MG Oral Tab    No future appointments.     Please advise, thank you

## 2021-09-10 NOTE — TELEPHONE ENCOUNTER
Patient called requesting refill for:    HYDROcodone-acetaminophen  MG Oral Tab 150 tablet     [facility] 403 [facility], [address] [facility], [phone], [phone]    Please advise # [phone]

## 2021-09-10 NOTE — TELEPHONE ENCOUNTER
Pt called back. She wants to let Dr Katarzyna Ritchie know she only has enough medication to get her through tomorrow mid day.

## 2021-09-11 RX ORDER — HYDROCODONE BITARTRATE AND ACETAMINOPHEN 10; 325 MG/1; MG/1
1 TABLET ORAL EVERY 4 HOURS PRN
Qty: 150 TABLET | Refills: 0 | Status: SHIPPED | OUTPATIENT
Start: 2021-09-11 | End: 2021-10-04

## 2021-09-13 ENCOUNTER — TELEPHONE (OUTPATIENT)
Dept: FAMILY MEDICINE CLINIC | Facility: CLINIC | Age: 56
End: 2021-09-13

## 2021-09-13 RX ORDER — BACLOFEN 10 MG/1
10 TABLET ORAL 3 TIMES DAILY
Qty: 270 TABLET | Refills: 3 | Status: SHIPPED | OUTPATIENT
Start: 2021-09-13

## 2021-09-13 NOTE — TELEPHONE ENCOUNTER
Received fax from [employer] stating Baclofen 10 mg needs prior authorization. Message:  Plan does not cover this medication. Please call plan at [phone] to initiate prior authorization or call/fax pharmacy to change medication.  Patient ID # is [id number]

## 2021-09-13 NOTE — TELEPHONE ENCOUNTER
PT NEEDS NEW SCRIPT-  FOR 3 TABS A DAY    BACLOFEN 10 MG Oral Tab     [facility] #[id number] Shy Esteban, [address] [facility],   [address], [phone], [address] [facility]

## 2021-09-14 NOTE — TELEPHONE ENCOUNTER
Patient confirmed is taking Baclofen 10 mg tid, not the former sig. Patient also confirmed that insurance has covered this.

## 2021-09-23 DIAGNOSIS — G71.02 MUSCULAR DYSTROPHY, FACIOSCAPULOHUMERAL (HCC): ICD-10-CM

## 2021-09-23 RX ORDER — PHENYTOIN SODIUM 100 MG/1
CAPSULE, EXTENDED RELEASE ORAL
Qty: 60 CAPSULE | Refills: 0 | Status: SHIPPED | OUTPATIENT
Start: 2021-09-23 | End: 2021-10-26

## 2021-09-23 NOTE — TELEPHONE ENCOUNTER
Medication: Phenytoin 100 mg     Date of last refill: 02/24/21 with 1 addt refill  [place]     Last office visit: 9/23/2020  Due back to clinic per last office note:  RTN in 1 year  Date next office visit scheduled:

## 2021-10-04 RX ORDER — HYDROCODONE BITARTRATE AND ACETAMINOPHEN 10; 325 MG/1; MG/1
1 TABLET ORAL EVERY 4 HOURS PRN
Qty: 150 TABLET | Refills: 0 | Status: SHIPPED | OUTPATIENT
Start: 2021-10-04 | End: 2021-10-27

## 2021-10-04 NOTE — TELEPHONE ENCOUNTER
PT CALLED TO REQUEST A REFILL OF THE FOLLOWING MEDICATION     HYDROcodone-acetaminophen  MG Oral Tab    [facility] #[id number] Fidelia Brown, [address] [facility],  AT [facility] OF [facility] [address] Fabricio Ornelas [facility], [phone], [phone]

## 2021-10-26 DIAGNOSIS — G71.02 MUSCULAR DYSTROPHY, FACIOSCAPULOHUMERAL (HCC): ICD-10-CM

## 2021-10-26 RX ORDER — PHENYTOIN SODIUM 100 MG/1
CAPSULE, EXTENDED RELEASE ORAL
Qty: 60 CAPSULE | Refills: 0 | Status: SHIPPED | OUTPATIENT
Start: 2021-10-26 | End: 2021-11-22

## 2021-10-27 RX ORDER — HYDROCODONE BITARTRATE AND ACETAMINOPHEN 10; 325 MG/1; MG/1
1 TABLET ORAL EVERY 4 HOURS PRN
Qty: 150 TABLET | Refills: 0 | Status: SHIPPED | OUTPATIENT
Start: 2021-10-27 | End: 2021-11-22

## 2021-10-27 NOTE — TELEPHONE ENCOUNTER
LOV: 5/27/21  Last Refill: 10/4/21 #150 0 RF    Future Appointments   Date Time Provider Marsha Pang   12/22/2021 11:20 AM MD SPRING Stock

## 2021-10-27 NOTE — TELEPHONE ENCOUNTER
Patient called requesting refill for:    HYDROcodone-acetaminophen  MG Oral Tab 150 tablet     [facility] 403 [facility], [address] [place] [address], [phone], [phone]      Please advise # [phone]

## 2021-11-20 NOTE — TELEPHONE ENCOUNTER
Pt called to request a refill of the following medication:    HYDROcodone-acetaminophen  MG Oral Tab    [facility] #[id number] Paris Henriquez, [address] [place] [address], [phone], [phone]    **pt aware Dr Yosvany Rasheed is not

## 2021-11-22 DIAGNOSIS — G71.02 MUSCULAR DYSTROPHY, FACIOSCAPULOHUMERAL (HCC): ICD-10-CM

## 2021-11-22 RX ORDER — GABAPENTIN 100 MG/1
CAPSULE ORAL
Qty: 60 CAPSULE | Refills: 2 | Status: SHIPPED | OUTPATIENT
Start: 2021-11-22

## 2021-11-22 RX ORDER — PHENYTOIN SODIUM 100 MG/1
CAPSULE, EXTENDED RELEASE ORAL
Qty: 60 CAPSULE | Refills: 1 | Status: SHIPPED | OUTPATIENT
Start: 2021-11-22 | End: 2022-01-24

## 2021-11-22 RX ORDER — HYDROCODONE BITARTRATE AND ACETAMINOPHEN 10; 325 MG/1; MG/1
1 TABLET ORAL EVERY 4 HOURS PRN
Qty: 150 TABLET | Refills: 0 | Status: SHIPPED | OUTPATIENT
Start: 2021-11-22 | End: 2021-12-15

## 2021-11-22 NOTE — TELEPHONE ENCOUNTER
LOV: 5/27/21   Last Refill: 8/23/21 #60 2 RF  Future Appointments   Date Time Provider Marsha Pang   12/22/2021 11:20 AM MD [place] [facility] Tobi Dietz

## 2021-11-22 NOTE — TELEPHONE ENCOUNTER
Medication: Phenytoin 100 mg     Date of last refill: 10/26/21  [place]     Last office visit: 9/23/2020  Due back to clinic per last office note:  RTN in 1 year  Date next office visit scheduled: 12/22/21         Last O

## 2021-12-15 RX ORDER — HYDROCODONE BITARTRATE AND ACETAMINOPHEN 10; 325 MG/1; MG/1
1 TABLET ORAL EVERY 4 HOURS PRN
Qty: 150 TABLET | Refills: 0 | Status: SHIPPED | OUTPATIENT
Start: 2021-12-15 | End: 2022-01-07

## 2021-12-15 NOTE — TELEPHONE ENCOUNTER
Patient called requesting refill for:    HYDROcodone-acetaminophen  MG Oral Tab [facility] [address][id number] Sav Puentes, [address], [phone], [phone]    Please advise # [phone]

## 2021-12-15 NOTE — TELEPHONE ENCOUNTER
LOV: 5/27/21   Last Refill: 11/22/21 #150 0 RF    Future Appointments   Date Time Provider Marsha Pang   12/22/2021 11:20 AM MD SPRING [facility]

## 2021-12-28 ENCOUNTER — MED REC SCAN ONLY (OUTPATIENT)
Dept: FAMILY MEDICINE CLINIC | Facility: CLINIC | Age: 56
End: 2021-12-28

## 2022-01-03 ENCOUNTER — TELEMEDICINE (OUTPATIENT)
Dept: FAMILY MEDICINE CLINIC | Facility: CLINIC | Age: 57
End: 2022-01-03
Payer: MEDICAID

## 2022-01-03 DIAGNOSIS — J96.01 ACUTE RESPIRATORY FAILURE WITH HYPOXIA (HCC): ICD-10-CM

## 2022-01-03 DIAGNOSIS — R06.00 DYSPNEA ON EXERTION: ICD-10-CM

## 2022-01-03 DIAGNOSIS — R19.7 DIARRHEA, UNSPECIFIED TYPE: ICD-10-CM

## 2022-01-03 DIAGNOSIS — J12.82 PNEUMONIA DUE TO COVID-19 VIRUS: ICD-10-CM

## 2022-01-03 DIAGNOSIS — U07.1 PNEUMONIA DUE TO COVID-19 VIRUS: ICD-10-CM

## 2022-01-03 DIAGNOSIS — U07.1 COVID-19: Primary | ICD-10-CM

## 2022-01-03 PROBLEM — R11.2 INTRACTABLE VOMITING WITH NAUSEA: Status: ACTIVE | Noted: 2021-12-16

## 2022-01-03 PROBLEM — G71.00 MUSCULAR DYSTROPHY (HCC): Status: ACTIVE | Noted: 2022-01-03

## 2022-01-03 PROCEDURE — 99213 OFFICE O/P EST LOW 20 MIN: CPT | Performed by: FAMILY MEDICINE

## 2022-01-03 NOTE — PROGRESS NOTES
This is a telemedicine visit with live, interactive video and audio. Patient understands and accepts financial responsibility for any deductible, co-insurance and/or co-pays associated with this service.     Harika King is here for her post  COVID Medications   Medication Sig Dispense Refill   • HYDROcodone-acetaminophen  MG Oral Tab Take 1 tablet by mouth every 4 (four) hours as needed for Pain.  150 tablet 0   • PHENYTOIN 100 MG Oral Cap TAKE 1 CAPSULE(100 MG) BY MOUTH TWICE DAILY 60 capsule virus    Acute respiratory failure with hypoxia [facility])          Zach Miller DO

## 2022-01-07 RX ORDER — HYDROCODONE BITARTRATE AND ACETAMINOPHEN 10; 325 MG/1; MG/1
1 TABLET ORAL EVERY 4 HOURS PRN
Qty: 150 TABLET | Refills: 0 | Status: SHIPPED | OUTPATIENT
Start: 2022-01-07 | End: 2022-01-31

## 2022-01-13 ENCOUNTER — OFFICE VISIT (OUTPATIENT)
Dept: FAMILY MEDICINE CLINIC | Facility: CLINIC | Age: 57
End: 2022-01-13
Payer: MEDICAID

## 2022-01-13 VITALS
RESPIRATION RATE: 12 BRPM | TEMPERATURE: 97 F | SYSTOLIC BLOOD PRESSURE: 122 MMHG | DIASTOLIC BLOOD PRESSURE: 76 MMHG | HEART RATE: 56 BPM

## 2022-01-13 DIAGNOSIS — U07.1 PNEUMONIA DUE TO COVID-19 VIRUS: ICD-10-CM

## 2022-01-13 DIAGNOSIS — U07.1 COVID-19: Primary | ICD-10-CM

## 2022-01-13 DIAGNOSIS — J12.82 PNEUMONIA DUE TO COVID-19 VIRUS: ICD-10-CM

## 2022-01-13 DIAGNOSIS — R06.00 DYSPNEA ON EXERTION: ICD-10-CM

## 2022-01-13 PROBLEM — I10 HYPERTENSION: Status: ACTIVE | Noted: 2021-12-16

## 2022-01-13 PROCEDURE — 99214 OFFICE O/P EST MOD 30 MIN: CPT | Performed by: FAMILY MEDICINE

## 2022-01-13 PROCEDURE — 3078F DIAST BP <80 MM HG: CPT | Performed by: FAMILY MEDICINE

## 2022-01-13 PROCEDURE — 3074F SYST BP LT 130 MM HG: CPT | Performed by: FAMILY MEDICINE

## 2022-01-24 DIAGNOSIS — G71.02 MUSCULAR DYSTROPHY, FACIOSCAPULOHUMERAL (HCC): ICD-10-CM

## 2022-01-24 RX ORDER — PHENYTOIN SODIUM 100 MG/1
CAPSULE, EXTENDED RELEASE ORAL
Qty: 60 CAPSULE | Refills: 1 | Status: SHIPPED | OUTPATIENT
Start: 2022-01-24

## 2022-01-24 NOTE — TELEPHONE ENCOUNTER
Medication: Phenytoin 100 mg     Date of last refill: 11/22/21  [place]     Last office visit: 9/23/2020  Due back to clinic per last office note:  RTN in 1 year  Date next office visit scheduled: 1/26/22        Last OV

## 2022-01-26 ENCOUNTER — TELEMEDICINE (OUTPATIENT)
Dept: NEUROLOGY | Facility: CLINIC | Age: 57
End: 2022-01-26

## 2022-01-26 DIAGNOSIS — H05.829 EXTRAOCULAR MUSCLE WEAKNESS, UNSPECIFIED LATERALITY: ICD-10-CM

## 2022-01-26 DIAGNOSIS — R25.2 MUSCLE CRAMPING: ICD-10-CM

## 2022-01-26 DIAGNOSIS — G71.02 MUSCULAR DYSTROPHY, FACIOSCAPULOHUMERAL (HCC): Primary | ICD-10-CM

## 2022-01-26 PROCEDURE — 99213 OFFICE O/P EST LOW 20 MIN: CPT | Performed by: OTHER

## 2022-01-26 NOTE — PROGRESS NOTES
[id number] [place] [address] with [facility]  1/26/2022    Time note started 1:58 PM    Telephone Encounter after patient consenting to process and charges.   ( x ) WITH VIDEO      ( ) without video    49year old female by mouth., Disp: , Rfl:   •  ibuprofen (MOTRIN) 600 MG Oral Tab, Take 600 mg by mouth every 6 (six) hours as needed.  Indications: PAIN, Disp: , Rfl:      CURRENT SITUATION:  Continues to stay stable not worsening  Cramping of muscles are under good control

## 2022-01-31 RX ORDER — HYDROCODONE BITARTRATE AND ACETAMINOPHEN 10; 325 MG/1; MG/1
1 TABLET ORAL EVERY 4 HOURS PRN
Qty: 150 TABLET | Refills: 0 | Status: SHIPPED | OUTPATIENT
Start: 2022-01-31

## 2022-01-31 NOTE — TELEPHONE ENCOUNTER
HYDROcodone-acetaminophen  MG Oral Tab    [facility] #[id number] Guy Bustosco, [address], [phone], [phone]      Pt called would like a refill on medication verified pharmacy       Thank you

## 2022-02-07 RX ORDER — LEVOTHYROXINE SODIUM 0.1 MG/1
TABLET ORAL
Qty: 90 TABLET | Refills: 2 | Status: SHIPPED | OUTPATIENT
Start: 2022-02-07 | End: 2022-05-08

## 2022-02-11 NOTE — TELEPHONE ENCOUNTER
Patient advised script ready for pickup. Verbalized understanding. Patient's  will  script.  Advised to bring photo ID PAST MEDICAL HISTORY:  Atrial fibrillation     Hypertrophic cardiomyopathy

## 2022-02-23 RX ORDER — HYDROCODONE BITARTRATE AND ACETAMINOPHEN 10; 325 MG/1; MG/1
1 TABLET ORAL EVERY 4 HOURS PRN
Qty: 150 TABLET | Refills: 0 | Status: SHIPPED | OUTPATIENT
Start: 2022-02-23 | End: 2022-03-19

## 2022-02-23 NOTE — TELEPHONE ENCOUNTER
HYDROcodone-acetaminophen  MG Oral Tab    [facility] #[id number] Shae Ariella, [address], [phone], [phone]      Pt calling would like a refill on medication verified pharmacy       Thank you

## 2022-02-24 RX ORDER — GABAPENTIN 100 MG/1
CAPSULE ORAL
Qty: 60 CAPSULE | Refills: 2 | Status: SHIPPED | OUTPATIENT
Start: 2022-02-24

## 2022-02-24 NOTE — TELEPHONE ENCOUNTER
Routing to provider per protocol. GABAPENTIN 100 MG Oral Cap  Last refilled on 11/22/21 for #60  with 2 rf. Last labs 10/7/20. Last seen on 1/13/22. No future appointments. Thank you.

## 2022-03-08 RX ORDER — CITALOPRAM 20 MG/1
TABLET ORAL
Qty: 90 TABLET | Refills: 3 | Status: SHIPPED | OUTPATIENT
Start: 2022-03-08 | End: 2022-06-06

## 2022-03-08 NOTE — TELEPHONE ENCOUNTER
Last refilled 3/16/21 for #90 with 3 RF  LOV with DS 1/13/22  No future appt with pcp  Protocol: none

## 2022-03-18 NOTE — TELEPHONE ENCOUNTER
HYDROcodone-acetaminophen  MG Oral Tab    Pt is going to be out of medication on Sunday     Pt would like refill sent to  [facility] 52 403 [facility], [address] [place] [address],   E [facility] 48 [facility] 70, [phone], [phone]

## 2022-03-19 RX ORDER — HYDROCODONE BITARTRATE AND ACETAMINOPHEN 10; 325 MG/1; MG/1
1 TABLET ORAL EVERY 4 HOURS PRN
Qty: 150 TABLET | Refills: 0 | Status: SHIPPED | OUTPATIENT
Start: 2022-03-19

## 2022-03-28 RX ORDER — PHENYTOIN SODIUM 100 MG/1
CAPSULE, EXTENDED RELEASE ORAL
Qty: 60 CAPSULE | Refills: 1 | Status: SHIPPED | OUTPATIENT
Start: 2022-03-28

## 2022-04-11 RX ORDER — HYDROCODONE BITARTRATE AND ACETAMINOPHEN 10; 325 MG/1; MG/1
1 TABLET ORAL EVERY 4 HOURS PRN
Qty: 150 TABLET | Refills: 0 | Status: SHIPPED | OUTPATIENT
Start: 2022-04-11

## 2022-04-11 NOTE — TELEPHONE ENCOUNTER
HYDROcodone-acetaminophen  MG Oral Tab      Pt would like refill sent to   [facility] 52 403 [facility], [facility] [address] [facility] 70, [phone], [phone]

## 2022-05-05 RX ORDER — HYDROCODONE BITARTRATE AND ACETAMINOPHEN 10; 325 MG/1; MG/1
1 TABLET ORAL EVERY 4 HOURS PRN
Qty: 150 TABLET | Refills: 0 | Status: SHIPPED | OUTPATIENT
Start: 2022-05-05

## 2022-05-05 NOTE — TELEPHONE ENCOUNTER
Pt would like the following refilled:    HYDROcodone-acetaminophen  MG Oral Tab [[id number]] (Order       Please send to:    [facility] 403 [facility], [address] [place] [address], [phone], [facility] [address]   Phone: [phone] Fax: [phone]     Thank you!

## 2022-05-18 ENCOUNTER — MED REC SCAN ONLY (OUTPATIENT)
Dept: FAMILY MEDICINE CLINIC | Facility: CLINIC | Age: 57
End: 2022-05-18

## 2022-05-24 DIAGNOSIS — G71.02 MUSCULAR DYSTROPHY, FACIOSCAPULOHUMERAL (HCC): ICD-10-CM

## 2022-05-25 RX ORDER — GABAPENTIN 100 MG/1
100 CAPSULE ORAL 2 TIMES DAILY
Qty: 60 CAPSULE | Refills: 5 | Status: SHIPPED | OUTPATIENT
Start: 2022-05-25 | End: 2023-09-13

## 2022-05-25 RX ORDER — PHENYTOIN SODIUM 100 MG/1
CAPSULE, EXTENDED RELEASE ORAL
Qty: 60 CAPSULE | Refills: 2 | Status: SHIPPED | OUTPATIENT
Start: 2022-05-25

## 2022-05-27 RX ORDER — HYDROCODONE BITARTRATE AND ACETAMINOPHEN 10; 325 MG/1; MG/1
1 TABLET ORAL EVERY 4 HOURS PRN
Qty: 150 TABLET | Refills: 0 | Status: SHIPPED | OUTPATIENT
Start: 2022-05-27

## 2022-06-08 RX ORDER — FAMOTIDINE 20 MG/1
TABLET, FILM COATED ORAL
Qty: 180 TABLET | Refills: 3 | Status: SHIPPED | OUTPATIENT
Start: 2022-06-08

## 2022-06-22 RX ORDER — HYDROCODONE BITARTRATE AND ACETAMINOPHEN 10; 325 MG/1; MG/1
1 TABLET ORAL EVERY 4 HOURS PRN
Qty: 150 TABLET | Refills: 0 | Status: SHIPPED | OUTPATIENT
Start: 2022-06-22

## 2022-07-14 RX ORDER — HYDROCODONE BITARTRATE AND ACETAMINOPHEN 10; 325 MG/1; MG/1
1 TABLET ORAL EVERY 4 HOURS PRN
Qty: 150 TABLET | Refills: 0 | Status: SHIPPED | OUTPATIENT
Start: 2022-07-14

## 2022-07-14 NOTE — TELEPHONE ENCOUNTER
Pt needs a refill of;    HYDROcodone-acetaminophen  MG Oral Tab [[id number]] (Order [id number]      Please send to:    [facility] 403 [facility], [address] [place] [address],   E [facility] [address] [place] [address], [phone] [address]   Phone: [phone] Fax: [phone]     Thank you!

## 2022-08-05 NOTE — TELEPHONE ENCOUNTER
Medication pended with correct pharmacy  Last refilled 7/14/22 for #150 with 0 RF  LOV with DS 1/13/22  No future appt with pcp  Protocol: none

## 2022-08-05 NOTE — TELEPHONE ENCOUNTER
[facility] 403 [facility], [facility] [address] [email or web address],  AT [facility] OF [facility], [facility] 41 [facility], [phone], [facility] [address]   Phone: [phone] Fax: [phone]     PATIENT REQUESTING REFILL ON HYDROCODONE

## 2022-08-06 RX ORDER — HYDROCODONE BITARTRATE AND ACETAMINOPHEN 10; 325 MG/1; MG/1
1 TABLET ORAL EVERY 4 HOURS PRN
Qty: 150 TABLET | Refills: 0 | Status: SHIPPED | OUTPATIENT
Start: 2022-08-06

## 2022-08-06 RX ORDER — HYDROCODONE BITARTRATE AND ACETAMINOPHEN 10; 325 MG/1; MG/1
1 TABLET ORAL EVERY 4 HOURS PRN
Qty: 150 TABLET | Refills: 0 | OUTPATIENT
Start: 2022-08-06

## 2022-08-16 DIAGNOSIS — G71.02 MUSCULAR DYSTROPHY, FACIOSCAPULOHUMERAL (HCC): ICD-10-CM

## 2022-08-17 RX ORDER — PHENYTOIN SODIUM 100 MG/1
CAPSULE, EXTENDED RELEASE ORAL
Qty: 60 CAPSULE | Refills: 5 | Status: SHIPPED | OUTPATIENT
Start: 2022-08-17

## 2022-08-30 RX ORDER — HYDROCODONE BITARTRATE AND ACETAMINOPHEN 10; 325 MG/1; MG/1
1 TABLET ORAL EVERY 4 HOURS PRN
Qty: 150 TABLET | Refills: 0 | Status: SHIPPED | OUTPATIENT
Start: 2022-08-30

## 2022-08-31 ENCOUNTER — TELEPHONE (OUTPATIENT)
Dept: FAMILY MEDICINE CLINIC | Facility: CLINIC | Age: 57
End: 2022-08-31

## 2022-08-31 NOTE — TELEPHONE ENCOUNTER
Melissa Momin 36. PATIENT IS ONLY ABLE TO  [facility] TODAY AND NOT TOMORROW WHEN THE REFILL IS DUE BECAUSE OF TRANSPORTATION. SHARON (PHARMACIST) WOULD LIKE TO KNOW IF THAT IS OK WITH DR ANDREA.  HIS DIRECT NUMBER [phone]

## 2022-09-01 RX ORDER — BACLOFEN 10 MG/1
TABLET ORAL
Qty: 270 TABLET | Refills: 3 | Status: SHIPPED | OUTPATIENT
Start: 2022-09-01

## 2022-09-01 NOTE — TELEPHONE ENCOUNTER
Routing to provider per protocol. baclofen 10 MG Oral Tab  Last refilled on 9/13/21 for #270  with 3 rf. Last labs 12/17/21. Last seen on 1/13/22. No future appointments. Thank you.

## 2022-09-24 RX ORDER — HYDROCODONE BITARTRATE AND ACETAMINOPHEN 10; 325 MG/1; MG/1
1 TABLET ORAL EVERY 4 HOURS PRN
Qty: 150 TABLET | Refills: 0 | Status: SHIPPED | OUTPATIENT
Start: 2022-09-24

## 2022-09-24 NOTE — TELEPHONE ENCOUNTER
Routing to provider per protocol. HYDROcodone-acetaminophen  MG Oral Tab  Last refilled on 8/30/22 for #150  with 0 rf. Last labs 12/17/21. Last seen on 1/13/22. No future appointments. Thank you.

## 2022-10-18 RX ORDER — HYDROCODONE BITARTRATE AND ACETAMINOPHEN 10; 325 MG/1; MG/1
1 TABLET ORAL EVERY 4 HOURS PRN
Qty: 150 TABLET | Refills: 0 | Status: SHIPPED | OUTPATIENT
Start: 2022-10-18

## 2022-10-18 NOTE — TELEPHONE ENCOUNTER
Routing to provider per protocol. HYDROcodone-acetaminophen  MG Oral Tab  Last refilled on 9/24/22 for #150  with 0 rf. Last labs 12/17/21. Last seen on 1/13/22. No future appointments. Thank you.

## 2022-10-31 RX ORDER — LEVOTHYROXINE SODIUM 0.1 MG/1
TABLET ORAL
Qty: 90 TABLET | Refills: 2 | Status: SHIPPED | OUTPATIENT
Start: 2022-10-31 | End: 2023-01-29

## 2022-10-31 NOTE — TELEPHONE ENCOUNTER
Thyroid Supplements Protocol Failed 10/31/2022 05:12 AM   Protocol Details  TSH test in past 12 months    TSH value between 0.350 and 5.500 IU/ml    Appointment in past 12 or next 3 months     Routing to provider per protocol. LEVOTHYROXINE 100 MCG Oral Tab  Last refilled on 2/7/22 for #90  with 2 rf. Last labs 12/17/21. Last seen on 1/13/22. No future appointments. Thank you.

## 2022-11-10 NOTE — TELEPHONE ENCOUNTER
I'm going to defer this to Dr. Yokasta Calderon since her last script was 10/18/22, she should have enough to wait until he's back on Monday.

## 2022-11-10 NOTE — TELEPHONE ENCOUNTER
No refill protocol for this medication. Last refill: 10/18/2022 #150 with 0 refills  Last Visit: 1/13/2022  Next Visit: No future appointments. Forward to Dr. Tony Gupta (covering provider) please advise on refills. Thanks.

## 2022-11-11 RX ORDER — HYDROCODONE BITARTRATE AND ACETAMINOPHEN 10; 325 MG/1; MG/1
1 TABLET ORAL EVERY 4 HOURS PRN
Qty: 150 TABLET | Refills: 0 | Status: SHIPPED | OUTPATIENT
Start: 2022-11-11

## 2022-11-25 NOTE — TELEPHONE ENCOUNTER
----- Message from Lamar Leventhal, DO sent at 6/4/2019  8:18 AM CDT -----  Can notify Devante Toscano that her labs overall all look good, but her thyroid medication is too high, so lets go down to 100 mcg once a day and recall TSH in 1 month
Pt advised of results- verbalized understanding    New dosage of script sent over    Future orders placed with recall
yes

## 2022-12-02 RX ORDER — HYDROCODONE BITARTRATE AND ACETAMINOPHEN 10; 325 MG/1; MG/1
1 TABLET ORAL EVERY 4 HOURS PRN
Qty: 150 TABLET | Refills: 0 | Status: SHIPPED | OUTPATIENT
Start: 2022-12-02

## 2022-12-02 NOTE — TELEPHONE ENCOUNTER
No refill protocol for this medication. Last refill: 11/11/2022 #150 with 0 refills  Last Visit: 1/13/2022   Next Visit: No future appointments. Forward to Dr. Sharda Flores please advise on refills. Thanks.

## 2022-12-27 RX ORDER — HYDROCODONE BITARTRATE AND ACETAMINOPHEN 10; 325 MG/1; MG/1
1 TABLET ORAL EVERY 4 HOURS PRN
Qty: 150 TABLET | Refills: 0 | Status: SHIPPED | OUTPATIENT
Start: 2022-12-27

## 2023-01-09 ENCOUNTER — TELEPHONE (OUTPATIENT)
Dept: FAMILY MEDICINE CLINIC | Facility: CLINIC | Age: 58
End: 2023-01-09

## 2023-01-09 RX ORDER — HYDROCODONE BITARTRATE AND ACETAMINOPHEN 10; 325 MG/1; MG/1
1 TABLET ORAL EVERY 4 HOURS PRN
Qty: 150 TABLET | Refills: 0 | Status: SHIPPED | OUTPATIENT
Start: 2023-01-09

## 2023-01-09 NOTE — TELEPHONE ENCOUNTER
HYDROcodone-acetaminophen  MG Oral Tab    PT ADVISED THAT PER PHARMACY WHEN FILLED LAST TIME ONLY ABLE TO DO PARTIAL FILLING-THAT IS WHY SHE IS REQUESTING SO SOON.       PT WOULD LIKE SENT TO  [facility] [address] [place] [address], [address] [place] [address],  AT [facility] OF [facility] 48 [facility] 70, [phone], [phone]

## 2023-02-01 RX ORDER — HYDROCODONE BITARTRATE AND ACETAMINOPHEN 10; 325 MG/1; MG/1
1 TABLET ORAL EVERY 4 HOURS PRN
Qty: 150 TABLET | Refills: 0 | Status: SHIPPED | OUTPATIENT
Start: 2023-02-01

## 2023-02-01 NOTE — TELEPHONE ENCOUNTER
Last OV:01/13/2022  Last refill:01/09/2023 150 tabs, 0 refill    Medication pended, please sign if appropriate

## 2023-02-23 RX ORDER — HYDROCODONE BITARTRATE AND ACETAMINOPHEN 10; 325 MG/1; MG/1
1 TABLET ORAL EVERY 4 HOURS PRN
Qty: 150 TABLET | Refills: 0 | Status: SHIPPED | OUTPATIENT
Start: 2023-02-23

## 2023-02-28 RX ORDER — CITALOPRAM 20 MG/1
TABLET ORAL
Qty: 90 TABLET | Refills: 3 | Status: SHIPPED | OUTPATIENT
Start: 2023-02-28 | End: 2023-05-29

## 2023-02-28 NOTE — TELEPHONE ENCOUNTER
No refill protocol for this medication. Last refill: 3/08/2022 #90 with 3 refills  Last Visit: 1/13/2022  Next Visit: No future appointments. Forward to Dr. Nereyda Murrieta please advise on refills. Thanks.

## 2023-03-16 RX ORDER — HYDROCODONE BITARTRATE AND ACETAMINOPHEN 10; 325 MG/1; MG/1
1 TABLET ORAL EVERY 4 HOURS PRN
Qty: 150 TABLET | Refills: 0 | Status: SHIPPED | OUTPATIENT
Start: 2023-03-16

## 2023-03-17 ENCOUNTER — TELEPHONE (OUTPATIENT)
Dept: FAMILY MEDICINE CLINIC | Facility: CLINIC | Age: 58
End: 2023-03-17

## 2023-03-17 NOTE — TELEPHONE ENCOUNTER
[facility] 403 [facility], [address] [facility],  AT [facility] OF [address] [facility], [phone], [place] [address]   Phone: [phone] Fax: [phone]     PATIENT FIRST APOLOGIZES FOR THE INCONVENIENCE. SHE NEEDS THE HYDROCODONE TO NOT HAVE THE Q4HRS OR PRN ON IT. THE DIRECTIONS ARE LIMITING THE QUANTITY. SHE SAYS SOME DAYS SHE TAKES MORE THAN Q4HRS AND PUTS HER SHORT OF MEDICATION.

## 2023-04-07 RX ORDER — HYDROCODONE BITARTRATE AND ACETAMINOPHEN 10; 325 MG/1; MG/1
1 TABLET ORAL EVERY 4 HOURS PRN
Qty: 150 TABLET | Refills: 0 | Status: SHIPPED | OUTPATIENT
Start: 2023-04-07

## 2023-05-01 RX ORDER — HYDROCODONE BITARTRATE AND ACETAMINOPHEN 10; 325 MG/1; MG/1
1 TABLET ORAL EVERY 4 HOURS PRN
Qty: 150 TABLET | Refills: 0 | Status: SHIPPED | OUTPATIENT
Start: 2023-05-01

## 2023-05-01 NOTE — TELEPHONE ENCOUNTER
LOV 01/13/22  Last refill on 04/07/23, for #150 tabs, with 0 refills  HYDROcodone-acetaminophen  MG Oral Tab    No future appointments. Order(s) pending, please review. Thank you.

## 2023-05-24 RX ORDER — HYDROCODONE BITARTRATE AND ACETAMINOPHEN 10; 325 MG/1; MG/1
1 TABLET ORAL EVERY 4 HOURS PRN
Qty: 150 TABLET | Refills: 0 | Status: SHIPPED | OUTPATIENT
Start: 2023-05-24

## 2023-05-25 RX ORDER — HYDROCODONE BITARTRATE AND ACETAMINOPHEN 10; 325 MG/1; MG/1
1 TABLET ORAL EVERY 4 HOURS PRN
Qty: 150 TABLET | Refills: 0 | OUTPATIENT
Start: 2023-05-25

## 2023-05-25 NOTE — TELEPHONE ENCOUNTER
Sent to pharmacy as: HYDROcodone-Acetaminophen  MG Oral Tablet (Norco)    Earliest Fill Date: 5/24/2023    E-Prescribing Status: Receipt confirmed by pharmacy (5/24/2023 10:22 AM CDT)          Denied - duplicate

## 2023-06-03 RX ORDER — FAMOTIDINE 20 MG/1
TABLET, FILM COATED ORAL
Qty: 180 TABLET | Refills: 3 | Status: SHIPPED | OUTPATIENT
Start: 2023-06-03

## 2023-06-03 NOTE — TELEPHONE ENCOUNTER
Last refilled 6/8/22 for #180 with 3 RF  LOV with DS 1/13/22  No future appt with pcp  Routed to pcp to advise     Gastrointestinal Medication Protocol Failed 06/03/2023 05:14 AM    Appointment in past 6 or next 1 month

## 2023-06-16 NOTE — TELEPHONE ENCOUNTER
LOV 01/13/22  Last labs 12/16/2021  Last refill on 05/24/23, for #150 tabs, with 0 refills  HYDROcodone-acetaminophen  MG Oral Tab    No future appointments. Order(s) pending, please review. Thank you.

## 2023-06-17 RX ORDER — HYDROCODONE BITARTRATE AND ACETAMINOPHEN 10; 325 MG/1; MG/1
1 TABLET ORAL EVERY 4 HOURS PRN
Qty: 150 TABLET | Refills: 0 | Status: SHIPPED | OUTPATIENT
Start: 2023-06-17

## 2023-07-03 RX ORDER — BACLOFEN 10 MG/1
10 TABLET ORAL 3 TIMES DAILY
Qty: 270 TABLET | Refills: 3 | Status: SHIPPED | OUTPATIENT
Start: 2023-07-03

## 2023-07-10 RX ORDER — HYDROCODONE BITARTRATE AND ACETAMINOPHEN 10; 325 MG/1; MG/1
1 TABLET ORAL EVERY 4 HOURS PRN
Qty: 150 TABLET | Refills: 0 | Status: SHIPPED | OUTPATIENT
Start: 2023-07-10

## 2023-07-10 NOTE — TELEPHONE ENCOUNTER
LOV 01/13/22  Last labs 10/07/20  Last refill on 06/17/23, for #150 tabs, with 0 refills  HYDROcodone-acetaminophen  MG Oral Tab     No future appointments. Order(s) pending, please review. Thank you.

## 2023-08-02 RX ORDER — LEVOTHYROXINE SODIUM 0.1 MG/1
TABLET ORAL
Qty: 90 TABLET | Refills: 2 | OUTPATIENT
Start: 2023-08-02 | End: 2023-10-31

## 2023-08-03 RX ORDER — HYDROCODONE BITARTRATE AND ACETAMINOPHEN 7.5; 325 MG/1; MG/1
1 TABLET ORAL EVERY 6 HOURS PRN
Qty: 145 TABLET | Refills: 0 | Status: SHIPPED | OUTPATIENT
Start: 2023-08-03

## 2023-08-22 RX ORDER — HYDROCODONE BITARTRATE AND ACETAMINOPHEN 7.5; 325 MG/1; MG/1
1 TABLET ORAL EVERY 4 HOURS PRN
Qty: 145 TABLET | Refills: 0 | Status: SHIPPED | OUTPATIENT
Start: 2023-08-22

## 2023-08-22 NOTE — TELEPHONE ENCOUNTER
Hydrocodone was adjusted to 7.5, pt takes 1 pill every 4 hrs. Do not put every 6 hrs on script. [facility] on [address].

## 2023-08-25 ENCOUNTER — PATIENT MESSAGE (OUTPATIENT)
Dept: FAMILY MEDICINE CLINIC | Facility: CLINIC | Age: 58
End: 2023-08-25

## 2023-08-31 ENCOUNTER — MED REC SCAN ONLY (OUTPATIENT)
Dept: FAMILY MEDICINE CLINIC | Facility: CLINIC | Age: 58
End: 2023-08-31

## 2023-09-12 RX ORDER — HYDROCODONE BITARTRATE AND ACETAMINOPHEN 7.5; 325 MG/1; MG/1
1 TABLET ORAL EVERY 4 HOURS PRN
Qty: 145 TABLET | Refills: 0 | Status: SHIPPED | OUTPATIENT
Start: 2023-09-12 | End: 2023-09-13

## 2023-09-13 ENCOUNTER — TELEPHONE (OUTPATIENT)
Dept: FAMILY MEDICINE CLINIC | Facility: CLINIC | Age: 58
End: 2023-09-13

## 2023-09-13 RX ORDER — BACLOFEN 10 MG/1
10 TABLET ORAL 4 TIMES DAILY PRN
Qty: 270 TABLET | Refills: 3 | Status: SHIPPED | OUTPATIENT
Start: 2023-09-13

## 2023-09-13 RX ORDER — HYDROCODONE BITARTRATE AND ACETAMINOPHEN 7.5; 325 MG/1; MG/1
1 TABLET ORAL EVERY 4 HOURS PRN
Qty: 145 TABLET | Refills: 0 | Status: SHIPPED | OUTPATIENT
Start: 2023-09-13

## 2023-09-20 ENCOUNTER — OFFICE VISIT (OUTPATIENT)
Dept: FAMILY MEDICINE CLINIC | Facility: CLINIC | Age: 58
End: 2023-09-20
Payer: MEDICAID

## 2023-09-20 VITALS
HEART RATE: 62 BPM | TEMPERATURE: 96 F | DIASTOLIC BLOOD PRESSURE: 90 MMHG | OXYGEN SATURATION: 97 % | RESPIRATION RATE: 16 BRPM | SYSTOLIC BLOOD PRESSURE: 110 MMHG

## 2023-09-20 DIAGNOSIS — G71.02 MUSCULAR DYSTROPHY, FACIOSCAPULOHUMERAL (HCC): ICD-10-CM

## 2023-09-20 DIAGNOSIS — Z12.31 ENCOUNTER FOR SCREENING MAMMOGRAM FOR BREAST CANCER: ICD-10-CM

## 2023-09-20 DIAGNOSIS — H05.823 EXTRAOCULAR MUSCLE WEAKNESS OF BOTH EYES: ICD-10-CM

## 2023-09-20 DIAGNOSIS — G70.9 NEUROMUSCULAR RESPIRATORY WEAKNESS (HCC): ICD-10-CM

## 2023-09-20 DIAGNOSIS — J99 NEUROMUSCULAR RESPIRATORY WEAKNESS (HCC): ICD-10-CM

## 2023-09-20 DIAGNOSIS — Z00.00 ROUTINE HEALTH MAINTENANCE: Primary | ICD-10-CM

## 2023-09-20 PROBLEM — F32.A DEPRESSION: Status: ACTIVE | Noted: 2021-12-16

## 2023-09-20 LAB
ALBUMIN SERPL-MCNC: 3.9 G/DL (ref 3.4–5)
ALBUMIN/GLOB SERPL: 0.9 {RATIO} (ref 1–2)
ALP LIVER SERPL-CCNC: 82 U/L
ALT SERPL-CCNC: 28 U/L
ANION GAP SERPL CALC-SCNC: 1 MMOL/L (ref 0–18)
AST SERPL-CCNC: 20 U/L (ref 15–37)
BASOPHILS # BLD AUTO: 0.06 X10(3) UL (ref 0–0.2)
BASOPHILS NFR BLD AUTO: 0.6 %
BILIRUB SERPL-MCNC: 0.3 MG/DL (ref 0.1–2)
BUN BLD-MCNC: 10 MG/DL (ref 7–18)
CALCIUM BLD-MCNC: 9.7 MG/DL (ref 8.5–10.1)
CHLORIDE SERPL-SCNC: 105 MMOL/L (ref 98–112)
CHOLEST SERPL-MCNC: 170 MG/DL (ref ?–200)
CO2 SERPL-SCNC: 31 MMOL/L (ref 21–32)
CREAT BLD-MCNC: 0.85 MG/DL
EGFRCR SERPLBLD CKD-EPI 2021: 80 ML/MIN/1.73M2 (ref 60–?)
EOSINOPHIL # BLD AUTO: 0.26 X10(3) UL (ref 0–0.7)
EOSINOPHIL NFR BLD AUTO: 2.5 %
ERYTHROCYTE [DISTWIDTH] IN BLOOD BY AUTOMATED COUNT: 12.4 %
FASTING PATIENT LIPID ANSWER: YES
FASTING STATUS PATIENT QL REPORTED: YES
GLOBULIN PLAS-MCNC: 4.3 G/DL (ref 2.8–4.4)
GLUCOSE BLD-MCNC: 85 MG/DL (ref 70–99)
HCT VFR BLD AUTO: 45.6 %
HDLC SERPL-MCNC: 40 MG/DL (ref 40–59)
HGB BLD-MCNC: 14.3 G/DL
IMM GRANULOCYTES # BLD AUTO: 0.03 X10(3) UL (ref 0–1)
IMM GRANULOCYTES NFR BLD: 0.3 %
LDLC SERPL CALC-MCNC: 111 MG/DL (ref ?–100)
LYMPHOCYTES # BLD AUTO: 2.29 X10(3) UL (ref 1–4)
LYMPHOCYTES NFR BLD AUTO: 22.3 %
MCH RBC QN AUTO: 30.8 PG (ref 26–34)
MCHC RBC AUTO-ENTMCNC: 31.4 G/DL (ref 31–37)
MCV RBC AUTO: 98.3 FL
MONOCYTES # BLD AUTO: 0.86 X10(3) UL (ref 0.1–1)
MONOCYTES NFR BLD AUTO: 8.4 %
NEUTROPHILS # BLD AUTO: 6.77 X10 (3) UL (ref 1.5–7.7)
NEUTROPHILS # BLD AUTO: 6.77 X10(3) UL (ref 1.5–7.7)
NEUTROPHILS NFR BLD AUTO: 65.9 %
NONHDLC SERPL-MCNC: 130 MG/DL (ref ?–130)
OSMOLALITY SERPL CALC.SUM OF ELEC: 282 MOSM/KG (ref 275–295)
PLATELET # BLD AUTO: 267 10(3)UL (ref 150–450)
POTASSIUM SERPL-SCNC: 5 MMOL/L (ref 3.5–5.1)
PROT SERPL-MCNC: 8.2 G/DL (ref 6.4–8.2)
RBC # BLD AUTO: 4.64 X10(6)UL
SODIUM SERPL-SCNC: 137 MMOL/L (ref 136–145)
T4 FREE SERPL-MCNC: 1.1 NG/DL (ref 0.8–1.7)
TRIGL SERPL-MCNC: 101 MG/DL (ref 30–149)
TSI SER-ACNC: 3.68 MIU/ML (ref 0.36–3.74)
VLDLC SERPL CALC-MCNC: 17 MG/DL (ref 0–30)
WBC # BLD AUTO: 10.3 X10(3) UL (ref 4–11)

## 2023-09-20 PROCEDURE — 85025 COMPLETE CBC W/AUTO DIFF WBC: CPT | Performed by: FAMILY MEDICINE

## 2023-09-20 PROCEDURE — 84439 ASSAY OF FREE THYROXINE: CPT | Performed by: FAMILY MEDICINE

## 2023-09-20 PROCEDURE — 80061 LIPID PANEL: CPT | Performed by: FAMILY MEDICINE

## 2023-09-20 PROCEDURE — 84443 ASSAY THYROID STIM HORMONE: CPT | Performed by: FAMILY MEDICINE

## 2023-09-20 PROCEDURE — 3080F DIAST BP >= 90 MM HG: CPT | Performed by: FAMILY MEDICINE

## 2023-09-20 PROCEDURE — 99396 PREV VISIT EST AGE 40-64: CPT | Performed by: FAMILY MEDICINE

## 2023-09-20 PROCEDURE — 80053 COMPREHEN METABOLIC PANEL: CPT | Performed by: FAMILY MEDICINE

## 2023-09-20 PROCEDURE — 3074F SYST BP LT 130 MM HG: CPT | Performed by: FAMILY MEDICINE

## 2023-09-21 ENCOUNTER — TELEPHONE (OUTPATIENT)
Dept: FAMILY MEDICINE CLINIC | Facility: CLINIC | Age: 58
End: 2023-09-21

## 2023-09-21 DIAGNOSIS — G71.02 MUSCULAR DYSTROPHY, FACIOSCAPULOHUMERAL (HCC): Primary | ICD-10-CM

## 2023-09-21 NOTE — TELEPHONE ENCOUNTER
----- Message from Lucia Montejo DO sent at 9/21/2023 10:24 AM CDT -----  Notify Vera Vazquez  labs looked very good. Her lipids were excellent, her  kidney, liver function, blood sugar and thyroid were all normal. As was her blood count. She should be good for a year.  I am going to place a referral for pain management for her and see if there is anything additional they can maybe do to help us with her pain, also keep appt with Dr. Tyrone Corbtet

## 2023-09-21 NOTE — TELEPHONE ENCOUNTER
Spoke with the pt and advised of the lab results and recommendations  She v/u  Gave her the number to Dr. Joanne Alvares for pain management

## 2023-10-04 ENCOUNTER — OFFICE VISIT (OUTPATIENT)
Dept: PAIN CLINIC | Facility: CLINIC | Age: 58
End: 2023-10-04
Payer: MEDICAID

## 2023-10-04 VITALS
BODY MASS INDEX: 37 KG/M2 | WEIGHT: 225 LBS | OXYGEN SATURATION: 98 % | DIASTOLIC BLOOD PRESSURE: 84 MMHG | HEART RATE: 66 BPM | SYSTOLIC BLOOD PRESSURE: 116 MMHG

## 2023-10-04 DIAGNOSIS — G71.00 MUSCULAR DYSTROPHY (HCC): Primary | ICD-10-CM

## 2023-10-04 PROCEDURE — 3074F SYST BP LT 130 MM HG: CPT | Performed by: PHYSICIAN ASSISTANT

## 2023-10-04 PROCEDURE — 99204 OFFICE O/P NEW MOD 45 MIN: CPT | Performed by: PHYSICIAN ASSISTANT

## 2023-10-04 PROCEDURE — 3079F DIAST BP 80-89 MM HG: CPT | Performed by: PHYSICIAN ASSISTANT

## 2023-10-06 RX ORDER — HYDROCODONE BITARTRATE AND ACETAMINOPHEN 7.5; 325 MG/1; MG/1
1 TABLET ORAL EVERY 4 HOURS PRN
Qty: 145 TABLET | Refills: 0 | Status: SHIPPED | OUTPATIENT
Start: 2023-10-06

## 2023-10-06 NOTE — TELEPHONE ENCOUNTER
LOV 09/20/23  Last refill on 09/13/23, for #145 tabs, with 0 refills  HYDROcodone-acetaminophen (NORCO) 7.5-325 MG Oral Tab     No future appointments. Order(s) pending, please review. Thank you.

## 2023-10-12 RX ORDER — LEVOTHYROXINE SODIUM 0.1 MG/1
100 TABLET ORAL
Qty: 90 TABLET | Refills: 0 | Status: SHIPPED | OUTPATIENT
Start: 2023-10-12

## 2023-10-12 NOTE — TELEPHONE ENCOUNTER
Thyroid Supplements Protocol Cvzpfy62/12/2023 09:50 AM   Protocol Details TSH test in past 12 months    TSH value between 0.350 and 5.500 IU/ml    Appointment in past 12 or next 3 months     Refilled per protocol  levothyroxine 100 MCG Oral Tab   Last refilled on 8/2/23 #30 with 1 rf.   LOV- 9/20/23  Last labs- 9/20/23    Sent to pharmacy

## 2023-11-01 RX ORDER — HYDROCODONE BITARTRATE AND ACETAMINOPHEN 7.5; 325 MG/1; MG/1
1 TABLET ORAL EVERY 4 HOURS PRN
Qty: 145 TABLET | Refills: 0 | Status: SHIPPED | OUTPATIENT
Start: 2023-11-01

## 2023-11-01 NOTE — TELEPHONE ENCOUNTER
LOV 09/20/23  Last refill on 10/06/23, for #145 tabs, with 0 refills  HYDROcodone-acetaminophen (NORCO) 7.5-325 MG Oral Tab     No future appointments. Order(s) pending, please review. Thank you.

## 2023-11-10 RX ORDER — ONDANSETRON 4 MG/1
TABLET, ORALLY DISINTEGRATING ORAL
Qty: 30 TABLET | Refills: 3 | Status: SHIPPED | OUTPATIENT
Start: 2023-11-10

## 2023-11-10 NOTE — TELEPHONE ENCOUNTER
Pt failed refill protocol for the following reasons:   ondansetron 4 MG Oral Tablet Dispersible         Sig: DISSOLVE 1 T ON TONGUE Q EIGHT H    Disp: 30 tablet    Refills: 3    Start: 11/10/2023    Class: Normal    Non-formulary    Last ordered: 2 years ago (5/27/2021) by Jossue Sun, DO       To be filled at: [facility] [address] [address], [phone], [phone]                Last refill: 5/27/21  Last appt: 9/20/23  Next appt: No future appointments. Forward to Dr. Peterson Ron, please advise on refills. Thank you.

## 2023-11-27 RX ORDER — HYDROCODONE BITARTRATE AND ACETAMINOPHEN 7.5; 325 MG/1; MG/1
1 TABLET ORAL EVERY 4 HOURS PRN
Qty: 145 TABLET | Refills: 0 | Status: SHIPPED | OUTPATIENT
Start: 2023-11-27

## 2023-11-27 NOTE — TELEPHONE ENCOUNTER
Routing to provider per protocol. HYDROcodone-acetaminophen (NORCO) 7.5-325 MG Oral Tab   Last refilled on 11/1/23 for #145  with 0 rf. Last labs 9/20/23. Last seen on 9/20/23. No future appointments. Thank you.

## 2023-12-06 ENCOUNTER — TELEPHONE (OUTPATIENT)
Dept: FAMILY MEDICINE CLINIC | Facility: CLINIC | Age: 58
End: 2023-12-06

## 2023-12-06 NOTE — TELEPHONE ENCOUNTER
[address]    Patient was seen in the ER    Looking for [address] re-cert and if Dr. Martin Deleon will follow    Please adv  Thank you

## 2023-12-06 NOTE — TELEPHONE ENCOUNTER
Per DS ok to recertify      Called the number given and no identifying information- advised to call back

## 2023-12-22 ENCOUNTER — TELEPHONE (OUTPATIENT)
Dept: FAMILY MEDICINE CLINIC | Facility: CLINIC | Age: 58
End: 2023-12-22

## 2023-12-22 NOTE — TELEPHONE ENCOUNTER
Patient was sent home with stool softener bid but for only 7 days    Home health would like to continue    Please adv  Thank you

## 2023-12-22 NOTE — TELEPHONE ENCOUNTER
No refill protocol for this medication. Last refill: 11/27/2023 #145 with 0 refills  Last Visit: 9/20/2023  Next Visit: No future appointments. Forward to Dr. Polo Ormond please advise on refills. Thanks.

## 2023-12-22 NOTE — TELEPHONE ENCOUNTER
Spoke with Raquel Benitez and she tells me that the Pt was on Colace for 7 days when discharged from the hospital. [facility] is wondering if the pt should continue the stool softener. The pt is on narcotic pain medication. Spoke with Dr. Nicole Tinoco and she states that the pt can continue the colace OTC.       Adeel Strickland [facility] RN- ok to continue colace as needed  She v/u

## 2023-12-23 RX ORDER — HYDROCODONE BITARTRATE AND ACETAMINOPHEN 7.5; 325 MG/1; MG/1
1 TABLET ORAL EVERY 4 HOURS PRN
Qty: 145 TABLET | Refills: 0 | Status: SHIPPED | OUTPATIENT
Start: 2023-12-23

## 2024-01-05 ENCOUNTER — MED REC SCAN ONLY (OUTPATIENT)
Dept: FAMILY MEDICINE CLINIC | Facility: CLINIC | Age: 59
End: 2024-01-05

## 2024-01-05 RX ORDER — LEVOTHYROXINE SODIUM 0.1 MG/1
100 TABLET ORAL
Qty: 90 TABLET | Refills: 0 | Status: SHIPPED | OUTPATIENT
Start: 2024-01-05

## 2024-01-05 NOTE — TELEPHONE ENCOUNTER
Thyroid Supplements Protocol Nhgmqr9701/05/2024 05:14 AM   Protocol Details TSH test in past 12 months    TSH value between 0.350 and 5.500 IU/ml    Appointment in past 12 or next 3 months          LOV 9/20/23    Last Refill 10/12/23 #90 Refill 0     Labs  9/20/23    No future appointments.

## 2024-01-09 ENCOUNTER — TELEPHONE (OUTPATIENT)
Dept: FAMILY MEDICINE CLINIC | Facility: CLINIC | Age: 59
End: 2024-01-09

## 2024-01-09 NOTE — TELEPHONE ENCOUNTER
Letter mailed to patient reminding them they have outstanding orders.  Lab Frequency Next Occurrence   [facility] PERCY 2D+3D SCREENING BILAT (CPT=77067/35611) Once 09/20/2023

## 2024-01-17 ENCOUNTER — TELEPHONE (OUTPATIENT)
Dept: FAMILY MEDICINE CLINIC | Facility: CLINIC | Age: 59
End: 2024-01-17

## 2024-01-17 ENCOUNTER — HOME HEALTH CHARGES (OUTPATIENT)
Dept: FAMILY MEDICINE CLINIC | Facility: CLINIC | Age: 59
End: 2024-01-17

## 2024-01-17 DIAGNOSIS — G71.00 MUSCULAR DYSTROPHY, UNSPECIFIED (HCC): Primary | ICD-10-CM

## 2024-01-17 DIAGNOSIS — G71.02 MUSCULAR DYSTROPHY, FACIOSCAPULOHUMERAL (HCC): Primary | ICD-10-CM

## 2024-01-17 RX ORDER — HYDROCODONE BITARTRATE AND ACETAMINOPHEN 7.5; 325 MG/1; MG/1
1 TABLET ORAL EVERY 4 HOURS PRN
Qty: 145 TABLET | Refills: 0 | Status: SHIPPED | OUTPATIENT
Start: 2024-01-17

## 2024-01-17 NOTE — TELEPHONE ENCOUNTER
Pt needs the following refilled:    HYDROcodone-acetaminophen (NORCO) 7.5-325 MG Oral Tab [[id number]] (Order [id number]     Please send to:  [id number] - [place], [place] - [address] AT [facility] [address], [phone], [phone]   [address] [facility] [address]   Phone: [phone] Fax: [phone]   Thank you!

## 2024-01-17 NOTE — TELEPHONE ENCOUNTER
LOV: 9/20/23   Last Refill: 12/23/23  145 0 RF    No future appointments.       Urology  Neurogenic bladder with chronic indwelling Camacho  Enlarged prostate with obstruction  Patient started to feel some urgency and having some urine sure daughter decides may be an indication that bladder is gaining back some tone  Complex catheter change requiring coudé catheter     Procedure note  25 Spanish coudé catheter exchanged under sterile conditions  Removed catheter showed minimal calcifications  Bladder irrigated to clear  Follow-up 2 weeks for cystoscopy cystometrogram  Sweta Manzanares MD

## 2024-02-12 DIAGNOSIS — G71.02 MUSCULAR DYSTROPHY, FACIOSCAPULOHUMERAL (HCC): ICD-10-CM

## 2024-02-12 RX ORDER — HYDROCODONE BITARTRATE AND ACETAMINOPHEN 7.5; 325 MG/1; MG/1
1 TABLET ORAL EVERY 4 HOURS PRN
Qty: 145 TABLET | Refills: 0 | Status: SHIPPED | OUTPATIENT
Start: 2024-02-12

## 2024-02-23 ENCOUNTER — MED REC SCAN ONLY (OUTPATIENT)
Dept: FAMILY MEDICINE CLINIC | Facility: CLINIC | Age: 59
End: 2024-02-23

## 2024-03-06 DIAGNOSIS — G71.02 MUSCULAR DYSTROPHY, FACIOSCAPULOHUMERAL (HCC): ICD-10-CM

## 2024-03-06 RX ORDER — HYDROCODONE BITARTRATE AND ACETAMINOPHEN 7.5; 325 MG/1; MG/1
1 TABLET ORAL EVERY 4 HOURS PRN
Qty: 145 TABLET | Refills: 0 | Status: SHIPPED | OUTPATIENT
Start: 2024-03-06

## 2024-03-06 NOTE — TELEPHONE ENCOUNTER
Pt failed refill protocol for the following reasons:  HYDROcodone-acetaminophen (NORCO) 7.5-325 MG Oral Tab          Sig: Take 1 tablet by mouth every 4 (four) hours as needed for Pain.    Disp: 145 tablet    Refills: 0    Start: 3/6/2024    Earliest Fill Date: 3/6/2024    Class: Normal    Non-formulary For: Muscular dystrophy, facioscapulohumeral (HCC)    Last ordered: 3 weeks ago (2/12/2024) by Terry Smith, DO    Controlled Substance Medication Qlnxhl2103/06/2024 10:07 AM    This medication is a controlled substance - forward to provider to refill      To be filled at: [id number] - [place], [place] - [address] [facility] [address] [facility] AT [facility] [address], [phone], [phone]         Last refill: 2/12/24  Last appt: 9/20/23  Next appt: No future appointments.      Forward to Dr. Smith, please advise on refills. Thank you.

## 2024-03-29 DIAGNOSIS — G71.02 MUSCULAR DYSTROPHY, FACIOSCAPULOHUMERAL (HCC): ICD-10-CM

## 2024-03-30 RX ORDER — HYDROCODONE BITARTRATE AND ACETAMINOPHEN 7.5; 325 MG/1; MG/1
1 TABLET ORAL EVERY 4 HOURS PRN
Qty: 145 TABLET | Refills: 0 | Status: SHIPPED | OUTPATIENT
Start: 2024-03-30

## 2024-04-10 RX ORDER — LEVOTHYROXINE SODIUM 0.1 MG/1
100 TABLET ORAL
Qty: 90 TABLET | Refills: 0 | Status: SHIPPED | OUTPATIENT
Start: 2024-04-10

## 2024-04-10 NOTE — TELEPHONE ENCOUNTER
Thyroid Medication Protocol Ebyvrt33/10/2024 10:53 AM   Protocol Details TSH in past 12 months    Last TSH value is normal    In person appointment or virtual visit in the past 12 mos or appointment in next 3 mos     Refilled per protocol  LEVOTHYROXINE 100 MCG Oral Tab   Last refilled on 1/5/24 #90 with 0 rf.  LOV- 9/20/23  Last labs- 9/20/23    Sent to pharmacy

## 2024-04-24 DIAGNOSIS — G71.02 MUSCULAR DYSTROPHY, FACIOSCAPULOHUMERAL (HCC): ICD-10-CM

## 2024-04-24 RX ORDER — HYDROCODONE BITARTRATE AND ACETAMINOPHEN 7.5; 325 MG/1; MG/1
1 TABLET ORAL EVERY 4 HOURS PRN
Qty: 145 TABLET | Refills: 0 | Status: SHIPPED | OUTPATIENT
Start: 2024-04-24

## 2024-04-24 NOTE — TELEPHONE ENCOUNTER
Routing to provider per protocol.   HYDROcodone-acetaminophen (NORCO) 7.5-325 MG Oral Tab   Last refilled on 3/30/24 for #145  with 0 rf.   Last labs 12/4/23.   Last seen on 9/20/23.     No future appointments.       Thank you.

## 2024-04-29 NOTE — TELEPHONE ENCOUNTER
Orders signed and faxed back less than 2 seconds.   Neurological:      General: No focal deficit present.      Mental Status: He is alert and oriented to person, place, and time.      Cranial Nerves: No facial asymmetry.      Motor: Weakness present.      Gait: Gait abnormal.      Comments: Presents in wheelchair.  There is chronic weakness due to cerebral palsy.   Psychiatric:         Mood and Affect: Mood normal.         Behavior: Behavior normal.       /70 (Site: Left Upper Arm, Position: Sitting, Cuff Size: Large Adult)   Pulse 83   Resp 16   Ht 1.549 m (5' 1\")   SpO2 95%   BMI 31.55 kg/m²     Assessment:       ICD-10-CM    1. Paranoid schizophrenia (HCC)  F20.0       2. Infantile cerebral palsy (HCC)  G80.9       3. Acquired hypothyroidism  E03.9 TSH     T4, Free      4. Prediabetes  R73.03       5. Mixed hyperlipidemia  E78.2 lovastatin (MEVACOR) 40 MG tablet      6. Screen for colon cancer  Z12.11 Fecal DNA Colorectal cancer screening (Cologuard)      7. Need for Tdap vaccination  Z23 Tdap, BOOSTRIX, (age 10 yrs+), IM               Plan:   Assessment & Plan   1.  Patient with history of schizophrenia.  He will continue following with psychiatry.  2.  Patient with history of cerebral palsy.  No changes.  3.  Plan update TSH and T4 with history of hypothyroidism.  Pending results we will adjust dose.  4.  Most recent A1c is increased to 6.1.  We will monitor closely.  I did recommend reducing processed foods and starchy carbohydrates.  5.  Patient given refill of lovastatin to use for hyperlipidemia.  6.  Patient agreeable to Cologuard to screen colon cancer.  7.  Patient agreeable to Tdap for vaccination.    Return in about 6 months (around 10/29/2024) for AWV.    Orders Placed This Encounter   Procedures    Fecal DNA Colorectal cancer screening (Cologuard)    Tdap, BOOSTRIX, (age 10 yrs+), IM    TSH     Standing Status:   Future     Number of Occurrences:   1     Standing Expiration Date:   4/29/2025    T4, Free     Standing

## 2024-05-20 DIAGNOSIS — G71.02 MUSCULAR DYSTROPHY, FACIOSCAPULOHUMERAL (HCC): ICD-10-CM

## 2024-05-20 RX ORDER — HYDROCODONE BITARTRATE AND ACETAMINOPHEN 7.5; 325 MG/1; MG/1
1 TABLET ORAL EVERY 4 HOURS PRN
Qty: 145 TABLET | Refills: 0 | Status: SHIPPED | OUTPATIENT
Start: 2024-05-20

## 2024-05-29 NOTE — TELEPHONE ENCOUNTER
Hypertension Medications Protocol Oiisei5405/29/2024 07:19 AM   Protocol Details CMP or BMP in past 12 months    Last BP reading less than 140/90    In person appointment or virtual visit in the past 12 mos or appointment in next 3 mos    EGFRCR or GFRNAA > 50     Refilled per protocol  METOPROLOL TARTRATE 25 MG Oral Tab   Last refilled on 8/29/23 #90 with 2 rf.  LOV- 9/20/23  Last labs- 12/4/23    Sent to pharmacy

## 2024-06-12 DIAGNOSIS — G71.02 MUSCULAR DYSTROPHY, FACIOSCAPULOHUMERAL (HCC): ICD-10-CM

## 2024-06-12 RX ORDER — HYDROCODONE BITARTRATE AND ACETAMINOPHEN 7.5; 325 MG/1; MG/1
1 TABLET ORAL EVERY 4 HOURS PRN
Qty: 145 TABLET | Refills: 0 | OUTPATIENT
Start: 2024-06-12

## 2024-06-12 NOTE — TELEPHONE ENCOUNTER
Controlled Substance Medication Ixhohk2206/12/2024 12:37 PM    This medication is a controlled substance - forward to provider to refill   Routing to provider per protocol.   HYDROcodone-acetaminophen (NORCO) 7.5-325 MG Oral Tab   Last refilled on 5/20/24 for #145  with 0 rf.   Last labs 12/4/24.   Last seen on 9/20/23.     No future appointments.       Thank you.     
 used

## 2024-06-13 DIAGNOSIS — G71.02 MUSCULAR DYSTROPHY, FACIOSCAPULOHUMERAL (HCC): ICD-10-CM

## 2024-06-13 RX ORDER — HYDROCODONE BITARTRATE AND ACETAMINOPHEN 7.5; 325 MG/1; MG/1
1 TABLET ORAL EVERY 4 HOURS PRN
Qty: 145 TABLET | Refills: 0 | Status: SHIPPED | OUTPATIENT
Start: 2024-06-13

## 2024-06-13 NOTE — TELEPHONE ENCOUNTER
Last refill was 5/20/24  #145 tablets  Patient takes them every 4 hours (6 tablets a day)    So that is a 24 day supply- yeterday was day 24 and refill was denied. Patient is calling today.    Can we refill medication? Pended for provider review

## 2024-06-29 RX ORDER — BACLOFEN 10 MG/1
10 TABLET ORAL 4 TIMES DAILY PRN
Qty: 270 TABLET | Refills: 3 | Status: SHIPPED | OUTPATIENT
Start: 2024-06-29

## 2024-06-29 NOTE — TELEPHONE ENCOUNTER
Routing to provider per protocol.   baclofen 10 MG Oral Tab   Last refilled on 9/13/23 for #270  with 3 rf.   Last labs 12/4/23.   Last seen on 9/20/23.     No future appointments.       Thank you.

## 2024-07-05 RX ORDER — LEVOTHYROXINE SODIUM 0.1 MG/1
100 TABLET ORAL
Qty: 90 TABLET | Refills: 2 | Status: SHIPPED | OUTPATIENT
Start: 2024-07-05

## 2024-07-05 NOTE — TELEPHONE ENCOUNTER
Thyroid Medication Protocol Oeblti3607/05/2024 11:58 AM   Protocol Details TSH in past 12 months    Last TSH value is normal    In person appointment or virtual visit in the past 12 mos or appointment in next 3 mos       LOV 9/20/23     Last Refill   Medication Quantity Refills Start End   LEVOTHYROXINE 100 MCG Oral Tab 90 tablet 0 4/10/2024        Labs 12/4/23     No future appointments.

## 2024-07-08 DIAGNOSIS — G71.02 MUSCULAR DYSTROPHY, FACIOSCAPULOHUMERAL (HCC): ICD-10-CM

## 2024-07-08 RX ORDER — HYDROCODONE BITARTRATE AND ACETAMINOPHEN 7.5; 325 MG/1; MG/1
1 TABLET ORAL EVERY 4 HOURS PRN
Qty: 145 TABLET | Refills: 0 | Status: SHIPPED | OUTPATIENT
Start: 2024-07-08

## 2024-07-31 DIAGNOSIS — G71.02 MUSCULAR DYSTROPHY, FACIOSCAPULOHUMERAL (HCC): ICD-10-CM

## 2024-07-31 RX ORDER — HYDROCODONE BITARTRATE AND ACETAMINOPHEN 7.5; 325 MG/1; MG/1
1 TABLET ORAL EVERY 4 HOURS PRN
Qty: 145 TABLET | Refills: 0 | Status: SHIPPED | OUTPATIENT
Start: 2024-07-31

## 2024-08-06 ENCOUNTER — TELEPHONE (OUTPATIENT)
Dept: FAMILY MEDICINE CLINIC | Facility: CLINIC | Age: 59
End: 2024-08-06

## 2024-08-06 NOTE — TELEPHONE ENCOUNTER
Message sent to patient to verify if she needs orders placed or if she had care gaps done at another location

## 2024-08-06 NOTE — TELEPHONE ENCOUNTER
Routing to primary care physician- Patient is due for Care gap Management:  Mammogram  PAP   Colon Cancer Screening    Do you know if patient gets Bharati's at another facility?  RN could not find information in chart.

## 2024-08-06 NOTE — TELEPHONE ENCOUNTER
I THOUGHT SHE DID THEM HERE,   Not sure where she would get PAP,  gyne would likely be best , and I think public aide pays for cologuard?

## 2024-08-14 NOTE — PROGRESS NOTES
Pt with abscess to bottom of right buttock - increased in size/drainage since patient was seen yesterday. Pt was started on keflex yesterday.    This is a telemedicine visit with live, interactive video and audio. Patient understands and accepts financial responsibility for any deductible, co-insurance and/or co-pays associated with this service.     Janice Weber is here for her post  COVID AND VOMITING   Current Outpatient Medications   Medication Sig Dispense Refill   • Ascorbic Acid (VITAMIN C OR) Take by mouth. • ZINC OR Use as directed in the mouth or throat.      • HYDROcodone-acetaminophen  MG Oral Tab Take 1 tablet by mouth e virus      Maintain avoidance of crowds continue to mask, lets get her labs and mammogram in the spring whe it warms up a bit  [employer], DO

## 2024-08-23 DIAGNOSIS — G71.02 MUSCULAR DYSTROPHY, FACIOSCAPULOHUMERAL (HCC): ICD-10-CM

## 2024-08-23 RX ORDER — HYDROCODONE BITARTRATE AND ACETAMINOPHEN 7.5; 325 MG/1; MG/1
1 TABLET ORAL EVERY 4 HOURS PRN
Qty: 145 TABLET | Refills: 0 | Status: SHIPPED | OUTPATIENT
Start: 2024-08-23

## 2024-08-23 NOTE — TELEPHONE ENCOUNTER
Controlled Substance Medication Xlajkj2508/23/2024 09:04 AM    This medication is a controlled substance - forward to provider to refill      Routing to provider per protocol.   HYDROcodone-acetaminophen (NORCO) 7.5-325 MG Oral Tab   Last refilled on 7/31/24 for #145  with 0 rf.   Last labs 12/4/23.   Last seen on 9/20/23.     No future appointments.       Thank you.

## 2024-09-17 DIAGNOSIS — G71.02 MUSCULAR DYSTROPHY, FACIOSCAPULOHUMERAL (HCC): ICD-10-CM

## 2024-09-17 RX ORDER — HYDROCODONE BITARTRATE AND ACETAMINOPHEN 7.5; 325 MG/1; MG/1
1 TABLET ORAL EVERY 4 HOURS PRN
Qty: 145 TABLET | Refills: 0 | OUTPATIENT
Start: 2024-09-17

## 2024-09-17 RX ORDER — HYDROCODONE BITARTRATE AND ACETAMINOPHEN 7.5; 325 MG/1; MG/1
1 TABLET ORAL EVERY 4 HOURS PRN
Qty: 145 TABLET | Refills: 0 | Status: SHIPPED | OUTPATIENT
Start: 2024-09-17

## 2024-09-17 NOTE — TELEPHONE ENCOUNTER
PT CALLED AND ADV THAT SHE DOES NEEDS REFILL - LAST FILL DATE WAS 8/24/24.    PT TAKES 6 PILLS PER DAY = 180  WAS ONLY PRESCRIBED - 145    PT SOON TO BE OUT OF MEDS    HYDROcodone-acetaminophen (NORCO) 7.5-325 MG Oral Tab     [address]    THANK YOU

## 2024-09-17 NOTE — TELEPHONE ENCOUNTER
Controlled Substance Medication Padsmw0209/17/2024 09:09 AM    This medication is a controlled substance - forward to provider to refill   Routing to provider per protocol.   HYDROcodone-acetaminophen (NORCO) 7.5-325 MG Oral Tab   Last refilled on 8/23/24 for #145  with 0 rf.   Last labs 12/4/23.   Last seen on 9/20/23.     No future appointments.       Thank you.     Refusing because patient should still have some left.

## 2024-10-11 DIAGNOSIS — G71.02 MUSCULAR DYSTROPHY, FACIOSCAPULOHUMERAL (HCC): ICD-10-CM

## 2024-10-11 RX ORDER — HYDROCODONE BITARTRATE AND ACETAMINOPHEN 7.5; 325 MG/1; MG/1
1 TABLET ORAL EVERY 4 HOURS PRN
Qty: 145 TABLET | Refills: 0 | OUTPATIENT
Start: 2024-10-11

## 2024-10-11 NOTE — TELEPHONE ENCOUNTER
Controlled Substance Medication Idjtcc69/11/2024 10:12 AM    This medication is a controlled substance - forward to provider to refill      Routing to provider per protocol.   HYDROcodone-acetaminophen (NORCO) 7.5-325 MG Oral Tab   Last refilled on 9/17/24 for #145  with 0 rf.   Last labs 12/4/23.   Last seen on 9/20/23.     No future appointments.       Thank you.     Patient should still have some.

## 2024-10-12 DIAGNOSIS — G71.02 MUSCULAR DYSTROPHY, FACIOSCAPULOHUMERAL (HCC): ICD-10-CM

## 2024-10-12 RX ORDER — HYDROCODONE BITARTRATE AND ACETAMINOPHEN 7.5; 325 MG/1; MG/1
1 TABLET ORAL EVERY 4 HOURS PRN
Qty: 145 TABLET | Refills: 0 | Status: SHIPPED | OUTPATIENT
Start: 2024-10-12

## 2024-10-12 NOTE — TELEPHONE ENCOUNTER
Patient is calling for refill on medication- it was deneid yesterday as the clinical team thought it should be every 30 days    Can you please refill patient medication

## 2024-11-04 DIAGNOSIS — G71.02 MUSCULAR DYSTROPHY, FACIOSCAPULOHUMERAL (HCC): ICD-10-CM

## 2024-11-04 RX ORDER — HYDROCODONE BITARTRATE AND ACETAMINOPHEN 7.5; 325 MG/1; MG/1
1 TABLET ORAL EVERY 4 HOURS PRN
Qty: 145 TABLET | Refills: 0 | Status: SHIPPED | OUTPATIENT
Start: 2024-11-04

## 2024-11-04 NOTE — TELEPHONE ENCOUNTER
Routing to provider per protocol.   HYDROcodone-acetaminophen (NORCO) 7.5-325 MG Oral Tab   Last refilled on 10/12/24 for #145  with 0 rf.   Last labs 12/4/23.   Last seen on 9/20/23.     No future appointments.       Thank you.

## 2024-11-04 NOTE — TELEPHONE ENCOUNTER
Patient states she has been having issues getting her Rx. States it is a quantity for 24 days which is why it looks like an early request but is not. Confirmed to patient will forward her request to clinical staff. No denials found. SUKHI

## 2024-11-26 DIAGNOSIS — G71.02 MUSCULAR DYSTROPHY, FACIOSCAPULOHUMERAL (HCC): ICD-10-CM

## 2024-11-26 RX ORDER — HYDROCODONE BITARTRATE AND ACETAMINOPHEN 7.5; 325 MG/1; MG/1
1 TABLET ORAL EVERY 4 HOURS PRN
Qty: 145 TABLET | Refills: 0 | Status: SHIPPED | OUTPATIENT
Start: 2024-11-26

## 2024-12-20 ENCOUNTER — TELEPHONE (OUTPATIENT)
Dept: FAMILY MEDICINE CLINIC | Facility: CLINIC | Age: 59
End: 2024-12-20

## 2024-12-20 DIAGNOSIS — G71.02 MUSCULAR DYSTROPHY, FACIOSCAPULOHUMERAL (HCC): ICD-10-CM

## 2024-12-20 RX ORDER — HYDROCODONE BITARTRATE AND ACETAMINOPHEN 7.5; 325 MG/1; MG/1
1 TABLET ORAL EVERY 4 HOURS PRN
Qty: 145 TABLET | Refills: 0 | OUTPATIENT
Start: 2024-12-20

## 2024-12-20 RX ORDER — HYDROCODONE BITARTRATE AND ACETAMINOPHEN 7.5; 325 MG/1; MG/1
1 TABLET ORAL EVERY 4 HOURS PRN
Qty: 145 TABLET | Refills: 0 | Status: SHIPPED | OUTPATIENT
Start: 2024-12-20

## 2024-12-20 NOTE — TELEPHONE ENCOUNTER
PATIENT CALLING TO CHECK ON REFILL STATUS FOR HYDROcodone-acetaminophen (NORCO) 7.5-325 MG Oral Tab.       [id number] - [place], [place] - [address] AT [facility] [address], [phone], [phone]     STATES SHE HAS MUSCULAR DYSTROPHY AND GETS ENOUGH FOR 24 DAYS AT A TIME. DOES NOT UNDERSTAND WHY IT WAS DENIED. PATIENT WOULD LIKE TO SPEAK TO NURSE.

## 2024-12-20 NOTE — TELEPHONE ENCOUNTER
Refill request sent as well today, please review to assure not ordered twice.   Last refill 11/26/2024(every 4 hours) #125

## 2024-12-20 NOTE — TELEPHONE ENCOUNTER
Pt wondering status of [place] refill.  States she only has pills available through Sunday but then she is out.  Pt states she is not able to  medication on Monday so is trying to get it before then.  Please advise.

## 2024-12-20 NOTE — TELEPHONE ENCOUNTER
Controlled Substance Medication Okqwld6112/20/2024 08:35 AM    This medication is a controlled substance - forward to provider to refill      Routing to provider per protocol.   HYDROcodone-acetaminophen (NORCO) 7.5-325 MG Oral Tab   Last refilled on 11/26/24 for #145  with 0 rf.   Last labs 12/4/23.   Last seen on 9/20/23.     No future appointments.       Thank you.

## 2025-01-14 DIAGNOSIS — G71.02 MUSCULAR DYSTROPHY, FACIOSCAPULOHUMERAL (HCC): ICD-10-CM

## 2025-01-14 RX ORDER — HYDROCODONE BITARTRATE AND ACETAMINOPHEN 7.5; 325 MG/1; MG/1
1 TABLET ORAL EVERY 4 HOURS PRN
Qty: 145 TABLET | Refills: 0 | Status: SHIPPED | OUTPATIENT
Start: 2025-01-14

## 2025-01-14 NOTE — TELEPHONE ENCOUNTER
Norco last refilled 12/20/24  No future appointment in office  Due for physical  LOV with  9/20/23    Controlled Substance Medication Slzzax8601/14/2025 10:39 AM    This medication is a controlled substance - forward to provider to refill    Medication is active on med list

## 2025-02-07 DIAGNOSIS — G71.02 MUSCULAR DYSTROPHY, FACIOSCAPULOHUMERAL (HCC): ICD-10-CM

## 2025-02-07 NOTE — TELEPHONE ENCOUNTER
Controlled Substance Medication Zcznwj9602/07/2025 10:33 AM    This medication is a controlled substance - forward to provider to refill    Medication is active on med list          Last refill:    Disp Refills Start End    HYDROcodone-acetaminophen (NORCO) 7.5-325 MG Oral Tab 145 tablet 0 1/14/2025       Last Visit: 9/20/23    Next Visit: No future appointments.      Forward to Dr. Smith please advise on refills. Thanks.

## 2025-02-08 RX ORDER — HYDROCODONE BITARTRATE AND ACETAMINOPHEN 7.5; 325 MG/1; MG/1
1 TABLET ORAL EVERY 4 HOURS PRN
Qty: 145 TABLET | Refills: 0 | Status: SHIPPED | OUTPATIENT
Start: 2025-02-08 | End: 2025-02-10

## 2025-02-08 NOTE — TELEPHONE ENCOUNTER
Pt has scheduled appointment for March 5. Her new insurance starts March 1, and has transportation issues . She is wondering if the Norco can be filled until she sees you in March. Explained that I would ask and if she needs to be seen sooner due to not being able to get a refill we will call her and let her know.

## 2025-02-10 DIAGNOSIS — G71.02 MUSCULAR DYSTROPHY, FACIOSCAPULOHUMERAL (HCC): ICD-10-CM

## 2025-02-10 RX ORDER — HYDROCODONE BITARTRATE AND ACETAMINOPHEN 7.5; 325 MG/1; MG/1
1 TABLET ORAL EVERY 4 HOURS PRN
Qty: 145 TABLET | Refills: 0 | Status: SHIPPED | OUTPATIENT
Start: 2025-02-10

## 2025-02-10 NOTE — TELEPHONE ENCOUNTER
PATIENT NEEDS HYDROcodone-acetaminophen (NORCO) 7.5-325 MG Oral Tab     NEEDS REFILL SENT TO [facility] #[id number] - [facility] [address] AT [facility] [address], [phone], [phone]       [facility] IN [place] DOES NOT HAVE MEDICATION IN STOCK.     PATIENT STATES SHE IS IN A LOT OF PAIN.

## 2025-02-21 ENCOUNTER — MED REC SCAN ONLY (OUTPATIENT)
Dept: FAMILY MEDICINE CLINIC | Facility: CLINIC | Age: 60
End: 2025-02-21

## 2025-02-21 ENCOUNTER — TELEPHONE (OUTPATIENT)
Dept: FAMILY MEDICINE CLINIC | Facility: CLINIC | Age: 60
End: 2025-02-21

## 2025-02-21 RX ORDER — METOPROLOL TARTRATE 25 MG/1
TABLET, FILM COATED ORAL
Qty: 90 TABLET | Refills: 2 | Status: SHIPPED | OUTPATIENT
Start: 2025-02-21

## 2025-02-21 NOTE — TELEPHONE ENCOUNTER
RECEIVED MEDICAL RECORD REQUEST FROM [place] [employer]    REQUESTING MEDICAL RECORDS    SENT TO [place]

## 2025-02-21 NOTE — TELEPHONE ENCOUNTER
Pt failed refill protocol for the following reasons:  Hypertension Medications Protocol Insouv0902/21/2025 09:07 AM   Protocol Details CMP or BMP in past 12 months    EGFRCR or GFRNAA > 50         Last refill: 5/29/2024 #90 with 2 refills  Last appt: 9/20/2023  Next appt:   Future Appointments   Date Time Provider Department Center   3/5/2025  3:20 PM Terry Smith DO [facility] [place]         Forward to Dr. Smith, please advise on refills. Thank you.

## 2025-03-04 ENCOUNTER — TELEPHONE (OUTPATIENT)
Dept: FAMILY MEDICINE CLINIC | Facility: CLINIC | Age: 60
End: 2025-03-04

## 2025-03-04 DIAGNOSIS — G71.02 MUSCULAR DYSTROPHY, FACIOSCAPULOHUMERAL (HCC): ICD-10-CM

## 2025-03-04 RX ORDER — HYDROCODONE BITARTRATE AND ACETAMINOPHEN 7.5; 325 MG/1; MG/1
1 TABLET ORAL EVERY 4 HOURS PRN
Qty: 145 TABLET | Refills: 0 | Status: SHIPPED | OUTPATIENT
Start: 2025-03-04

## 2025-03-04 NOTE — TELEPHONE ENCOUNTER
Last OV:09/20/2023 physical   Last refill:02/10/2025 145 tabs, 0 refills     Appointment scheduled 03/12/2025  Future Appointments   Date Time Provider Department Center   3/12/2025  1:00 PM Terry Smith DO [facility] [place]      Medication pended, please sign if appropriate

## 2025-03-04 NOTE — TELEPHONE ENCOUNTER
Patient requests refill    HYDROcodone-acetaminophen (NORCO) 7.5-325 MG Oral Tab     [address]

## 2025-03-04 NOTE — TELEPHONE ENCOUNTER
PT CALLED AND ADV THAT PHARMACY SHOULD BE SENDING SOMETHING ON OVER TO VERIFY QUANTITY FOR:     HYDROcodone-acetaminophen (NORCO) 7.5-325 MG Oral Tab     PLEASE LOOK FOR FAX FROM PHARMACY    THANK YOU

## 2025-03-05 NOTE — TELEPHONE ENCOUNTER
Fax from [facility] plans and notes no PA required for this medication      Called the pharmacy and the Pt picked up the medication

## 2025-03-06 NOTE — TELEPHONE ENCOUNTER
Spoke with pharmacy - patient did  the 5 days quantity of hydrocodone.  Still has a prescription from February for 145 days that can be filled.  Instructed to fill.  Notified patient that the script would be available for pick-up

## 2025-03-06 NOTE — TELEPHONE ENCOUNTER
Patient states insurance is requiring PA, states the pharmacist cancelled the RX for 145 quantity and patient picked up a quantity for 5 days, will be out on Sunday. Endorsed to RN.

## 2025-03-12 ENCOUNTER — OFFICE VISIT (OUTPATIENT)
Dept: FAMILY MEDICINE CLINIC | Facility: CLINIC | Age: 60
End: 2025-03-12
Payer: MEDICAID

## 2025-03-12 VITALS
DIASTOLIC BLOOD PRESSURE: 84 MMHG | SYSTOLIC BLOOD PRESSURE: 120 MMHG | HEART RATE: 58 BPM | RESPIRATION RATE: 16 BRPM | OXYGEN SATURATION: 98 % | TEMPERATURE: 97 F

## 2025-03-12 DIAGNOSIS — Z12.11 COLON CANCER SCREENING: ICD-10-CM

## 2025-03-12 DIAGNOSIS — Z12.31 BREAST CANCER SCREENING BY MAMMOGRAM: ICD-10-CM

## 2025-03-12 DIAGNOSIS — G71.02 MUSCULAR DYSTROPHY, FACIOSCAPULOHUMERAL (HCC): ICD-10-CM

## 2025-03-12 DIAGNOSIS — Z00.00 ROUTINE HEALTH MAINTENANCE: Primary | ICD-10-CM

## 2025-03-12 LAB
ALBUMIN SERPL-MCNC: 4.5 G/DL (ref 3.2–4.8)
ALBUMIN/GLOB SERPL: 1.6 {RATIO} (ref 1–2)
ALP LIVER SERPL-CCNC: 76 U/L
ALT SERPL-CCNC: 18 U/L
ANION GAP SERPL CALC-SCNC: 7 MMOL/L (ref 0–18)
AST SERPL-CCNC: 24 U/L (ref ?–34)
BASOPHILS # BLD AUTO: 0.02 X10(3) UL (ref 0–0.2)
BASOPHILS NFR BLD AUTO: 0.2 %
BILIRUB SERPL-MCNC: 0.4 MG/DL (ref 0.3–1.2)
BUN BLD-MCNC: 8 MG/DL (ref 9–23)
CALCIUM BLD-MCNC: 10.1 MG/DL (ref 8.7–10.6)
CHLORIDE SERPL-SCNC: 105 MMOL/L (ref 98–112)
CHOLEST SERPL-MCNC: 174 MG/DL (ref ?–200)
CO2 SERPL-SCNC: 30 MMOL/L (ref 21–32)
CREAT BLD-MCNC: 0.57 MG/DL
EGFRCR SERPLBLD CKD-EPI 2021: 106 ML/MIN/1.73M2 (ref 60–?)
EOSINOPHIL # BLD AUTO: 0.13 X10(3) UL (ref 0–0.7)
EOSINOPHIL NFR BLD AUTO: 1.4 %
ERYTHROCYTE [DISTWIDTH] IN BLOOD BY AUTOMATED COUNT: 12.7 %
FASTING PATIENT LIPID ANSWER: YES
FASTING STATUS PATIENT QL REPORTED: YES
GLOBULIN PLAS-MCNC: 2.9 G/DL (ref 2–3.5)
GLUCOSE BLD-MCNC: 88 MG/DL (ref 70–99)
HCT VFR BLD AUTO: 42.7 %
HDLC SERPL-MCNC: 38 MG/DL (ref 40–59)
HGB BLD-MCNC: 13.8 G/DL
IMM GRANULOCYTES # BLD AUTO: 0.02 X10(3) UL (ref 0–1)
IMM GRANULOCYTES NFR BLD: 0.2 %
LDLC SERPL CALC-MCNC: 118 MG/DL (ref ?–100)
LYMPHOCYTES # BLD AUTO: 1.94 X10(3) UL (ref 1–4)
LYMPHOCYTES NFR BLD AUTO: 21.3 %
MCH RBC QN AUTO: 31.4 PG (ref 26–34)
MCHC RBC AUTO-ENTMCNC: 32.3 G/DL (ref 31–37)
MCV RBC AUTO: 97 FL
MONOCYTES # BLD AUTO: 0.62 X10(3) UL (ref 0.1–1)
MONOCYTES NFR BLD AUTO: 6.8 %
NEUTROPHILS # BLD AUTO: 6.37 X10 (3) UL (ref 1.5–7.7)
NEUTROPHILS # BLD AUTO: 6.37 X10(3) UL (ref 1.5–7.7)
NEUTROPHILS NFR BLD AUTO: 70.1 %
NONHDLC SERPL-MCNC: 136 MG/DL (ref ?–130)
OSMOLALITY SERPL CALC.SUM OF ELEC: 292 MOSM/KG (ref 275–295)
PLATELET # BLD AUTO: 225 10(3)UL (ref 150–450)
POTASSIUM SERPL-SCNC: 5 MMOL/L (ref 3.5–5.1)
PROT SERPL-MCNC: 7.4 G/DL (ref 5.7–8.2)
RBC # BLD AUTO: 4.4 X10(6)UL
SODIUM SERPL-SCNC: 142 MMOL/L (ref 136–145)
T4 FREE SERPL-MCNC: 1.7 NG/DL (ref 0.8–1.7)
TRIGL SERPL-MCNC: 99 MG/DL (ref 30–149)
TSI SER-ACNC: 0.39 UIU/ML (ref 0.55–4.78)
VLDLC SERPL CALC-MCNC: 17 MG/DL (ref 0–30)
WBC # BLD AUTO: 9.1 X10(3) UL (ref 4–11)

## 2025-03-12 PROCEDURE — 80061 LIPID PANEL: CPT | Performed by: FAMILY MEDICINE

## 2025-03-12 PROCEDURE — 99396 PREV VISIT EST AGE 40-64: CPT | Performed by: FAMILY MEDICINE

## 2025-03-12 PROCEDURE — 85025 COMPLETE CBC W/AUTO DIFF WBC: CPT | Performed by: FAMILY MEDICINE

## 2025-03-12 PROCEDURE — 84439 ASSAY OF FREE THYROXINE: CPT | Performed by: FAMILY MEDICINE

## 2025-03-12 PROCEDURE — 84443 ASSAY THYROID STIM HORMONE: CPT | Performed by: FAMILY MEDICINE

## 2025-03-12 PROCEDURE — 80053 COMPREHEN METABOLIC PANEL: CPT | Performed by: FAMILY MEDICINE

## 2025-03-12 PROCEDURE — 83735 ASSAY OF MAGNESIUM: CPT | Performed by: FAMILY MEDICINE

## 2025-03-12 NOTE — PROGRESS NOTES
HPI:   Hope Valentin is a 59 year old female who presents for a complete physical exam. Symptoms: denies discharge, itching, burning or dysuria. Patient complains of continued muscle pain. She describes it as muscle spasms  in her legs that will not relent sn spite of baclofen and the Norco  Which she takes every 4-6 hours and even still does not get relief. She does have some neuropathy in her feet, and did try some gabapentin but it did not work sos she's dealing with the pain. Denies radicular pain, also notes some weakness.    There is no immunization history on file for this patient.  Wt Readings from Last 6 Encounters:   10/04/23 225 lb (102.1 kg)   05/27/21 225 lb 6.4 oz (102.2 kg)   10/07/20 225 lb 9.6 oz (102.3 kg)   09/23/20 200 lb (90.7 kg)   03/29/18 200 lb (90.7 kg)   05/01/17 195 lb (88.5 kg)     There is no height or weight on file to calculate BMI.     Lab Results   Component Value Date    GLU 85 09/20/2023    GLU 77 10/07/2020    GLU 79 04/27/2018     Lab Results   Component Value Date    CHOLEST 170 09/20/2023    CHOLEST 225 (H) 10/07/2020    CHOLEST 185 09/22/2013     Lab Results   Component Value Date    HDL 40 09/20/2023    HDL 45 10/07/2020    HDL 32 (L) 09/22/2013     Lab Results   Component Value Date     (H) 09/20/2023     (H) 10/07/2020     09/22/2013     Lab Results   Component Value Date    AST 20 09/20/2023    AST 19 10/07/2020    AST 20 04/27/2018     Lab Results   Component Value Date    ALT 28 09/20/2023    ALT 20 10/07/2020    ALT 27 04/27/2018       Current Outpatient Medications   Medication Sig Dispense Refill    Naloxone HCl 4 MG/0.1ML Nasal Liquid 4 mg by Nasal route as needed. If patient remains unresponsive, repeat dose in other nostril 2-5 minutes after first dose. 1 kit 3    HYDROcodone-acetaminophen (NORCO) 7.5-325 MG Oral Tab Take 1 tablet by mouth every 4 (four) hours as needed for Pain. 145 tablet 0    METOPROLOL TARTRATE 25 MG Oral Tab TAKE 1/2  TABLET(12.5 MG) BY MOUTH TWICE DAILY 90 tablet 2    levothyroxine 100 MCG Oral Tab Take 1 tablet (100 mcg total) by mouth before breakfast. 90 tablet 2    BACLOFEN 10 MG Oral Tab TAKE 1 TABLET(10 MG) BY MOUTH FOUR TIMES DAILY AS NEEDED 270 tablet 3    ondansetron 4 MG Oral Tablet Dispersible DISSOLVE 1 T ON TONGUE Q EIGHT H 30 tablet 3    DiphenhydrAMINE HCl 25 MG Oral Cap Take 2 capsules (50 mg total) by mouth.      Cholecalciferol (VITAMIN D) 400 UNITS Oral Cap Take  by mouth.      ibuprofen (MOTRIN) 600 MG Oral Tab Take 1 tablet (600 mg total) by mouth every 6 (six) hours as needed.        Past Medical History:    MD (muscular dystrophy) ([facility])      Past Surgical History:   Procedure Laterality Date    Cholecystectomy      Endometrial ablation      Knee surgery  CRACKED BIRTH DEFECTS    CROOKED PATELLA.  2X R knee and 1x L knee    Leep / colpo - internal referral      Other surgical history      R shoulder scope      Family History   Problem Relation Age of Onset    High Blood Pressure Mother     Thyroid Disorder Mother     Renal Disease Father         ESRD    Heart Disease Father         CHF    Diabetes Father       Social History:   Social History     Socioeconomic History    Marital status:    Tobacco Use    Smoking status: Former     Current packs/day: 0.00     Average packs/day: 0.5 packs/day for 15.0 years (7.5 ttl pk-yrs)     Types: Cigarettes     Start date: 1998     Quit date: 2013     Years since quittin.5    Smokeless tobacco: Former   Vaping Use    Vaping status: Never Used   Substance and Sexual Activity    Alcohol use: No     Alcohol/week: 0.0 standard drinks of alcohol    Drug use: No   Other Topics Concern    Caffeine Concern Yes     Comment: 3 a day    Exercise Yes     Comment: does exercises at home     Social Drivers of Health     Food Insecurity: No Food Insecurity (2023)    [facility], [facility]    Food  Insecurity     Currently or in the past 3 months, have you worried your food would run out before you had money to buy more?: No     In the past 12 months, have you run out of food or been unable to get more?: No   Transportation Needs: No Transportation Needs (12/4/2023)    [facility] from [facility], [facility]    Transportation Needs     Currently or in the past 3 months, has lack of transportation kept you from medical appointments, getting food or medicine, or providing care to a family member?: Unrecognized value     Medical Transportation Needs?: No    Received from [facility], [facility]    Housing Stability     Occ: handicapped. : . Children: 3.   Exercise: none.  Diet: watches minimally     REVIEW OF SYSTEMS:   GENERAL: feels well otherwise  SKIN: denies any unusual skin lesions  EYES:denies blurred vision or double vision  HEENT: denies nasal congestion, sinus pain or ST  LUNGS: denies shortness of breath with exertion  CARDIOVASCULAR: denies chest pain on exertion  GI: denies abdominal pain,denies heartburn  : denies dysuria, vaginal discharge or itching,periods regular   MUSCULOSKELETAL: has muscle spasm that causes back pain, leg pain  NEURO: denies headaches, scapulo facial MD, weakness of the neck pain   PSYCHE: denies depression or anxiety  HEMATOLOGIC: denies hx of anemia  ENDOCRINE: denies thyroid history  ALL/ASTHMA: denies hx of allergy or asthma    EXAM:   /84   Pulse 58   Temp 97.1 °F (36.2 °C) (Temporal)   Resp 16   SpO2 98%   There is no height or weight on file to calculate BMI.   GENERAL: well developed, well nourished,in no apparent distress  SKIN: no rashes,no suspicious lesions  HEENT: atraumatic, normocephalic,ears and throat are clear  EYES:PERRLA, EOMI, normal optic disk,conjunctiva are clear  NECK: supple,no adenopathy,no bruits, wearing neck brace for support.  CHEST: no chest  tenderness  LUNGS: clear to auscultation  CARDIO: RRR without murmur  GI: good BS's,no masses, HSM or tenderness  MUSCULOSKELETAL: back is not tender, Restricted ROM of the back due to paralysis  EXTREMITIES: no cyanosis, clubbing or edema  NEURO: Oriented times three,cranial nerves are intact,motor 4/5 BUE/BLE and sensory are grossly intact    ASSESSMENT AND PLAN:   Hope Valentin is a 59 year old female who presents for a complete physical exam. NO Pap and pelvic done. Order put in for mammogram  Self breast exam explained. Health maintenance, will check fasting Lipids, CMP, and CBC. Pt referred  for COLOGUARD. Pt info handouts given for: exercise, low fat diet and breast self-exam. Pt' s weight is There is no height or weight on file to calculate BMI., recommended low fat diet and aerobic exercise 30 minutes three times weekly.  The patient indicates understanding of these issues and agrees to the plan.  The patient is asked to return for CPX in 1 year  Encounter Diagnoses   Name Primary?    Routine health maintenance Yes    Breast cancer screening by mammogram     Muscular dystrophy, facioscapulohumeral (HCC)     Colon cancer screening        Orders Placed This Encounter   Procedures    CBC W Differential W Platelet [E]    Comp Metabolic Panel (14) [E]    Lipid Panel [E]    TSH and Free T4 [E]    *Venipuncture       Meds & Refills for this Visit:  Requested Prescriptions     Signed Prescriptions Disp Refills    Naloxone HCl 4 MG/0.1ML Nasal Liquid 1 kit 3     Si mg by Nasal route as needed. If patient remains unresponsive, repeat dose in other nostril 2-5 minutes after first dose.       Imaging & Consults:  NEURO - INTERNAL  COLOGUARD COLON CANCER SCREENING (EXTERNAL)  [facility] PERCY 2D+3D SCREENING BILAT (CPT=77067/51730)  .

## 2025-03-14 ENCOUNTER — PATIENT MESSAGE (OUTPATIENT)
Dept: FAMILY MEDICINE CLINIC | Facility: CLINIC | Age: 60
End: 2025-03-14

## 2025-03-14 DIAGNOSIS — R25.2 MUSCLE CRAMPS: Primary | ICD-10-CM

## 2025-03-14 DIAGNOSIS — E03.9 ACQUIRED HYPOTHYROIDISM: ICD-10-CM

## 2025-03-14 DIAGNOSIS — E03.9 ACQUIRED HYPOTHYROIDISM: Primary | ICD-10-CM

## 2025-03-14 LAB — MAGNESIUM SERPL-MCNC: 2 MG/DL (ref 1.6–2.6)

## 2025-03-14 RX ORDER — LEVOTHYROXINE SODIUM 75 UG/1
75 TABLET ORAL
Qty: 30 TABLET | Refills: 1 | Status: SHIPPED | OUTPATIENT
Start: 2025-03-14

## 2025-03-14 RX ORDER — LEVOTHYROXINE SODIUM 75 UG/1
75 TABLET ORAL
Qty: 90 TABLET | Refills: 0 | OUTPATIENT
Start: 2025-03-14

## 2025-03-14 NOTE — TELEPHONE ENCOUNTER
Thyroid Medication Protocol Ufuzez5603/14/2025 11:33 AM   Protocol Details Last TSH value is normal    TSH in past 12 months    In person appointment or virtual visit in the past 12 mos or appointment in next 3 mos    Medication is active on med list   Patient requesting 90-day supply  Routing to provider per protocol.   levothyroxine 75 MCG Oral Tab   Last refilled on 3/14/25 for #30  with 1 rf.   Last labs 3/12/25.   Last seen on 3/12/25.     No future appointments.       Thank you.

## 2025-03-25 RX ORDER — BACLOFEN 10 MG/1
10 TABLET ORAL 4 TIMES DAILY PRN
Qty: 270 TABLET | Refills: 3 | Status: SHIPPED | OUTPATIENT
Start: 2025-03-25

## 2025-03-25 NOTE — TELEPHONE ENCOUNTER
Pt failed refill protocol for the following reasons:  Requested Renewals     Name from pharmacy: BACLOFEN 10MG TABLETS         Will file in chart as: BACLOFEN 10 MG Oral Tab    Sig: TAKE 1 TABLET(10 MG) BY MOUTH FOUR TIMES DAILY AS NEEDED    Disp: 270 tablet    Refills: 3 (Pharmacy requested: Not specified)    Start: 3/25/2025    Class: Normal    Non-formulary    Last ordered: 8 months ago (6/29/2024) by Terry Smith, DO    Last refill: 2/24/2025    Rx #: [id number]       To be filled at: [id number] - [place], [place] - [address] AT [facility] [address], [phone], [phone]            Last refill: 6/29/24  Last appt: 3/12/25  Next appt: No future appointments.      Forward to Dr. Smith, please advise on refills. Thank you.

## 2025-04-01 DIAGNOSIS — G71.02 MUSCULAR DYSTROPHY, FACIOSCAPULOHUMERAL (HCC): ICD-10-CM

## 2025-04-01 RX ORDER — HYDROCODONE BITARTRATE AND ACETAMINOPHEN 7.5; 325 MG/1; MG/1
1 TABLET ORAL EVERY 4 HOURS PRN
Qty: 145 TABLET | Refills: 0 | Status: SHIPPED | OUTPATIENT
Start: 2025-04-01

## 2025-04-01 NOTE — TELEPHONE ENCOUNTER
Last OV:03/12/2025 physical   Last refill:03/04/2025 145 tabs, 0 refill     Medication pended, please sign if appropriate

## 2025-04-24 DIAGNOSIS — G71.02 MUSCULAR DYSTROPHY, FACIOSCAPULOHUMERAL (HCC): ICD-10-CM

## 2025-04-24 RX ORDER — HYDROCODONE BITARTRATE AND ACETAMINOPHEN 7.5; 325 MG/1; MG/1
1 TABLET ORAL EVERY 4 HOURS PRN
Qty: 145 TABLET | Refills: 0 | Status: SHIPPED | OUTPATIENT
Start: 2025-04-24

## 2025-04-24 NOTE — TELEPHONE ENCOUNTER
HYDROcodone-acetaminophen (NORCO) 7.5-325 MG Oral Tab     Pt failed refill protocol for the following reasons:    Controlled Substance Medication Edttxq8804/24/2025 11:16 AM    This medication is a controlled substance - forward to provider to refill    Medication is active on med list      Last refill: 4/1/2025, for #145, 0 refill  Last appt: 3/12/2025  Next appt: No future appointments.      Forward to Dr. Terry Smith, please advise on refills. Thank you.

## 2025-04-29 NOTE — TELEPHONE ENCOUNTER
LOV: 5/1/17   Last Refill:10/23/17 #60 6 RF    Future Appointments  Date Time Provider Marsha Pang   7/30/2018 10:20 AM MD HOMER Pop Subjective:  HPI    Pt is here today for 2 week post op.  She states she was seen yesterday by Dr. Post due to severe pain in   the right eye. He put her back on the steroid drops and stopped the   Diamox. She has a severe reaction to the Diamox. The eye felt a little   better yesterday but she is in pain right eye.    Meds;   Xalatan QHS OU   Brimonidine BID OD  PF QID OD    Eye Meds:      Last edited by Dennis Longo on 4/29/2025  3:11 PM.        Exam:  See encounter report for full exam    Assessment:  1. Chronic angle-closure glaucoma of right eye, indeterminate stage  2. Chronic angle-closure glaucoma of left eye, indeterminate stage  OD>>OS  Mild hyperopia  - Referral from: Dr. Verdin Establishing me 1/2025.  - PMH: hypothyroidism, osteoporosis, headaches, HLD, depression  - Synopsis: diagnosed with glaucoma 2/2024  - H/o migraines  - Surg hx:   Complex phaco/IOL (IFIS) 2/11/25 Coulon - done for narrow AC/phacomorphic IOP  - Laser hx:    YAG OD 4/15/25 Coulon  - Glaucoma FHx: denies  -  / 620  - Gonio: closed OD, narrow OS (Coulon 1/2025)  - Tmax: 32/21  - Target IOP: pending  - Med adverse effects: diamox (intolerant)  - Medication hx: latanoprost 2/2024, cosopt 12/2024    Baseline photos pending    Last:  HVF not scheduled  OCT 2/2024 reviewed: thinning c/w glaucoma OD>OS  OCT today: sup<inf thinning, avg 56 OD  ST thinning, avg 84 -- baseline BMO, appears worse c/w RNFL OU 2/2024    3. Pseudophakia, right eye  4. Combined form of age-related cataract, left eye  Visually significant OD>OS, causing phacomorphic component of angle closure  - Guttae, PXF, or phacodonesis: none  - Dilation: deferred/very narrow  - Lens: OD DIBOO 24.5, MA60AC 23.5; OS DIBOO 25.5, MA60AC 24.5  - Veracity predicted MRx: OD -0.43 + 0.34 x 144° (SE -0.26); OS -0.54 + 0.50 x 10° (SE -0.29)    04/29/2025  S/p complex phaco/IOL OD (IFIS) 2/11/25  U-care add on 4/28/25 with Dr. Post for rebound iritis after stopping PF, may  require prolonged taper vs chronic anti-inflammatory discussed  Today: OCT mac wnl, AutoRx on target, but with trace cell/1+ DMF after stopping PF; wait on glasses until fully healed +/- sx OS    5. Dry eye syndrome of both eyes  ATs PRN    Plan:  Initial impression: IOP above goal OD, narrow angles with phacomorphic component OD>OS. Discussed phaco vs LPI, recommend phaco OD then OS.    Today: S/p complex phaco/IOL OD (IFIS) 2/11/25  - Continue PF QID OD until next visit, consider long taper to off vs taper to QD maintenance  - Continue Xalatan qhs/qhs, Brim 2/0  - HOLD Cosopt 2/2,  mg PO BID  - Update HVF post phaco    Today's visit is associated with current and anticipated ongoing medical care related to this patient's single serious/complex condition (glaucoma). Follow up is to be continued indefinitely to monitor the condition.    Return 2 weeks JDN, appreciate assistance while I'm out  Me August/September when I return     Elida Barbosa MD  [facility]

## 2025-05-19 DIAGNOSIS — G71.02 MUSCULAR DYSTROPHY, FACIOSCAPULOHUMERAL (HCC): ICD-10-CM

## 2025-05-20 RX ORDER — HYDROCODONE BITARTRATE AND ACETAMINOPHEN 7.5; 325 MG/1; MG/1
1 TABLET ORAL EVERY 4 HOURS PRN
Qty: 145 TABLET | Refills: 0 | Status: SHIPPED | OUTPATIENT
Start: 2025-05-20

## 2025-05-20 NOTE — TELEPHONE ENCOUNTER
HYDROcodone-acetaminophen (NORCO) 7.5-325 MG Oral Tab     Pt failed refill protocol for the following reasons:  Controlled Substance Medication Eaewhr0605/19/2025 07:23 PM    This medication is a controlled substance - forward to provider to refill    Medication is active on med list   Last refill: 4/24/2025, for #145, 0 refill  Last appt: 3/12/2024  Next appt: No future appointments.      Forward to Dr. Terry Smith, please advise on refills. Thank you.

## 2025-06-12 DIAGNOSIS — G71.02 MUSCULAR DYSTROPHY, FACIOSCAPULOHUMERAL (HCC): ICD-10-CM

## 2025-06-12 RX ORDER — HYDROCODONE BITARTRATE AND ACETAMINOPHEN 7.5; 325 MG/1; MG/1
1 TABLET ORAL EVERY 4 HOURS PRN
Qty: 145 TABLET | Refills: 0 | Status: SHIPPED | OUTPATIENT
Start: 2025-06-12

## 2025-06-12 NOTE — TELEPHONE ENCOUNTER
Controlled Substance Medication Looybc9206/12/2025 10:33 AM    This medication is a controlled substance - forward to provider to refill    Medication is active on med list      Routing to provider per protocol.   HYDROcodone-acetaminophen (NORCO) 7.5-325 MG Oral Tab   Last refilled on 5/20/25 for #145  with 0 rf.   Last labs 3/12/25.   Last seen on 3/12/25.     No future appointments.       Thank you.     Patient should still have some?

## 2025-06-22 DIAGNOSIS — E03.9 ACQUIRED HYPOTHYROIDISM: ICD-10-CM

## 2025-06-23 RX ORDER — LEVOTHYROXINE SODIUM 75 UG/1
75 TABLET ORAL
Qty: 90 TABLET | Refills: 1 | Status: SHIPPED | OUTPATIENT
Start: 2025-06-23

## 2025-06-23 NOTE — TELEPHONE ENCOUNTER
Pt failed refill protocol for the following reasons:      Thyroid Medication Protocol Gasvqn8506/22/2025 12:00 PM   Protocol Details Last TSH value is normal          Last refill: 3/14/2025 #30 with 1 refill  Last appt: 3/12/2025   Next appt: No future appointments.      Forward to Dr. Smith, please advise on refills. Thank you.

## 2025-07-07 DIAGNOSIS — G71.02 MUSCULAR DYSTROPHY, FACIOSCAPULOHUMERAL (HCC): ICD-10-CM

## 2025-07-08 RX ORDER — HYDROCODONE BITARTRATE AND ACETAMINOPHEN 7.5; 325 MG/1; MG/1
1 TABLET ORAL EVERY 4 HOURS PRN
Qty: 145 TABLET | Refills: 0 | Status: SHIPPED | OUTPATIENT
Start: 2025-07-08

## 2025-07-08 NOTE — TELEPHONE ENCOUNTER
Please review. Protocol Failed; No Protocol      Recent fills: 5/20/2025, 6/13/2025  Last Rx written: 6/12/2025  Last office visit: 3/12/2025

## 2025-07-31 DIAGNOSIS — G71.02 MUSCULAR DYSTROPHY, FACIOSCAPULOHUMERAL (HCC): ICD-10-CM

## 2025-07-31 RX ORDER — HYDROCODONE BITARTRATE AND ACETAMINOPHEN 7.5; 325 MG/1; MG/1
1 TABLET ORAL EVERY 4 HOURS PRN
Qty: 145 TABLET | Refills: 0 | Status: SHIPPED | OUTPATIENT
Start: 2025-07-31

## 2025-08-25 DIAGNOSIS — G71.02 MUSCULAR DYSTROPHY, FACIOSCAPULOHUMERAL (HCC): ICD-10-CM

## 2025-08-25 RX ORDER — HYDROCODONE BITARTRATE AND ACETAMINOPHEN 7.5; 325 MG/1; MG/1
1 TABLET ORAL EVERY 4 HOURS PRN
Qty: 145 TABLET | Refills: 0 | Status: SHIPPED | OUTPATIENT
Start: 2025-08-25

## (undated) DIAGNOSIS — G71.02 MUSCULAR DYSTROPHY, FACIOSCAPULOHUMERAL (HCC): ICD-10-CM

## (undated) NOTE — LETTER
08/05/19        Brenda Munoz  [address] [address]      Dear Deena Taylor,    Our records indicate that you have outstanding lab work and or testing that was ordered for you and has not yet been completed:  Lab Frequency Next Mahsa Domínguez

## (undated) NOTE — LETTER
11/06/20          [address] [address]           Dear [facility] records indicate that you have outstanding lab work and or testing that was ordered for you and has not yet been completed:  Romana Nick

## (undated) NOTE — MR AVS SNAPSHOT
[address] [facility] [address]  [phone]               Thank you for choosing us for your health care visit with Gema Falcon DO.   We are glad to serve you and happy to provide you with this sum Levothyroxine Sodium 150 MCG Tabs   TAKE 1 TABLET BY MOUTH EVERY MORNING BEFORE BREAKFAST   Commonly known as:  SYNTHROID           metoprolol Tartrate 25 MG Tabs   TAKE 1/2 TABLET BY MOUTH TWICE DAILY   Commonly known as:  LOPRESSOR           Phen

## (undated) NOTE — LETTER
03/18/19        [address] [facility]      Dear Eren Coleman,    Our records indicate that you have outstanding lab work and or testing that was ordered for you and has not yet been completed:  Lab Frequency Next Martin Goss

## (undated) NOTE — LETTER
Lev Dillon  [address]  [address]    7/5/2019      Dear  Lev Dillon    In order to provide the highest quality care, MARIA ELENA Damon uses a sophisticated computer system to track our patie

## (undated) NOTE — MR AVS SNAPSHOT
[address] [facility] [address]  [phone]               Thank you for choosing us for your health care visit with Vannesa Tomlin DO.   We are glad to serve you and happy to provide you with this sum famoTIDine 20 MG Tabs   TAKE 1 TABLET BY MOUTH TWICE DAILY   Commonly known as:  PEPCID           HYDROcodone-acetaminophen  MG Tabs   Take 1 tablet by mouth every 4 (four) hours as needed for Pain.    Commonly known as:  NORCO           ibuprofen 60 Eat plenty of protein, keep the fat content low Sugars:  sodas and sports drinks, candies and desserts   Eat plenty of low-fat dairy products High fat meats and dairy   Choose whole grain products Foods high in sodium   Water is best for hydration Fast dawn

## (undated) NOTE — LETTER
Hope Valentin   [address]  [facility] [address]           Dear Hope Valentin     Our records indicate that you have outstanding lab work and or testing that was ordered for you and has not yet been completed:  Lab Frequency Next Occurrence   DULCE PERCY 2D+3D SCREENING BILAT (CPT=77067/09141) Once 09/20/2023      To provide you with the best possible care, please complete these orders at your earliest convenience. If you have recently completed these orders please disregard this letter.     If you have any questions please call the office at [phone].     Thank you,     [facility]

## (undated) NOTE — LETTER
[facility] [place] [facility] [address]  [phone]        08/23/17      WALI Lauren Loser  [address]  [address] [facility]        Dear Sona Dela Cruz      To help us provi

## (undated) NOTE — LETTER
[address] [place] Shows  [address]  [place] [address]  [phone]              4/6/2021        Mia Chávez  [address]  [place] [address]

## (undated) NOTE — MR AVS SNAPSHOT
[address] \[facility]\"" [id number] [phone]               Thank you for choosing us for your health care visit with KERI Hogan.   We are glad to serve you and happy to provide you with this summary of your visit of individual in advance and they must present an ID as well. ? The name of the person picking up your prescription must be documented in your chart.   Scheduling Tests    If your physician has ordered radiology tests such as MRI or CT scans, do not schedu Commonly known as:  LIORESAL           Citalopram Hydrobromide 20 MG Tabs   Take 1 tablet (20 mg total) by mouth daily. Commonly known as:  CeleXA           COLACE 100 MG Caps   Generic drug:  docusate sodium   Take 100 mg by mouth 2 (two) times daily.

## (undated) NOTE — MR AVS SNAPSHOT
[address] [facility] [address] [phone]               Thank you for choosing us for your health care visit with Jovany Sherwood DO.   We are glad to serve you and happy to provide you with this sum Commonly known as:  LIORESAL           Citalopram Hydrobromide 20 MG Tabs   TAKE 1 TABLET BY MOUTH EVERY DAY   Commonly known as:  CeleXA           COLACE 100 MG Caps   Generic drug:  docusate sodium   Take 100 mg by mouth 2 (two) times daily.            fa Please consider the following Lifestyle Modifications. Also, please return for a follow-up Blood Pressure Check in 1 month.      Lifestyle Modification Recommendations:    Modification Recommendation   Weight Reduction Maintain normal body weight (body mas